# Patient Record
Sex: FEMALE | NOT HISPANIC OR LATINO | Employment: OTHER
[De-identification: names, ages, dates, MRNs, and addresses within clinical notes are randomized per-mention and may not be internally consistent; named-entity substitution may affect disease eponyms.]

---

## 2017-06-13 PROBLEM — M79.18 MYOFASCIAL PAIN: Status: ACTIVE | Noted: 2017-06-13

## 2017-06-13 PROBLEM — M54.31 SCIATICA OF RIGHT SIDE: Status: ACTIVE | Noted: 2017-06-13

## 2017-06-16 PROBLEM — M54.41 ACUTE MIDLINE LOW BACK PAIN WITH RIGHT-SIDED SCIATICA: Status: ACTIVE | Noted: 2017-06-16

## 2017-10-23 PROBLEM — M51.26 HERNIATED LUMBAR INTERVERTEBRAL DISC: Status: ACTIVE | Noted: 2017-10-23

## 2017-11-01 ENCOUNTER — HOSPITAL (OUTPATIENT)
Dept: OTHER | Age: 72
End: 2017-11-01
Attending: ORTHOPAEDIC SURGERY

## 2017-11-02 ENCOUNTER — CHARTING TRANS (OUTPATIENT)
Dept: OTHER | Age: 72
End: 2017-11-02

## 2017-11-06 PROBLEM — M96.1 LUMBAR POST-LAMINECTOMY SYNDROME: Status: ACTIVE | Noted: 2017-11-06

## 2017-11-21 ENCOUNTER — CHARTING TRANS (OUTPATIENT)
Dept: OTHER | Age: 72
End: 2017-11-21

## 2017-11-21 ENCOUNTER — HOSPITAL (OUTPATIENT)
Dept: OTHER | Age: 72
End: 2017-11-21
Attending: ORTHOPAEDIC SURGERY

## 2017-11-22 ENCOUNTER — CHARTING TRANS (OUTPATIENT)
Dept: OTHER | Age: 72
End: 2017-11-22

## 2017-11-22 LAB
MAGNESIUM SERPL-MCNC: 2 MG/DL (ref 1.7–2.4)
POTASSIUM SERPL-SCNC: 4.6 MMOL/L (ref 3.4–5.1)

## 2017-11-24 ENCOUNTER — DIAGNOSTIC TRANS (OUTPATIENT)
Dept: OTHER | Age: 72
End: 2017-11-24

## 2017-11-24 LAB
ANALYZER ANC (IANC): ABNORMAL
ANION GAP SERPL CALC-SCNC: 9 MMOL/L (ref 10–20)
BASOPHILS # BLD: 0 THOUSAND/MCL (ref 0–0.3)
BASOPHILS NFR BLD: 0 %
BUN SERPL-MCNC: 18 MG/DL (ref 6–20)
BUN/CREAT SERPL: 33 (ref 7–25)
CALCIUM SERPL-MCNC: 7.8 MG/DL (ref 8.4–10.2)
CHLORIDE: 112 MMOL/L (ref 98–107)
CO2 SERPL-SCNC: 23 MMOL/L (ref 21–32)
CREAT SERPL-MCNC: 0.55 MG/DL (ref 0.51–0.95)
DIFFERENTIAL METHOD BLD: ABNORMAL
EOSINOPHIL # BLD: 0.3 THOUSAND/MCL (ref 0.1–0.5)
EOSINOPHIL NFR BLD: 3 %
ERYTHROCYTE [DISTWIDTH] IN BLOOD: 14.8 % (ref 11–15)
GLUCOSE SERPL-MCNC: 99 MG/DL (ref 65–99)
HEMATOCRIT: 23 % (ref 36–46.5)
HGB BLD-MCNC: 7.6 GM/DL (ref 12–15.5)
LYMPHOCYTES # BLD: 0.7 THOUSAND/MCL (ref 1–4)
LYMPHOCYTES NFR BLD: 6 %
MCH RBC QN AUTO: 28.4 PG (ref 26–34)
MCHC RBC AUTO-ENTMCNC: 33 GM/DL (ref 32–36.5)
MCV RBC AUTO: 85.8 FL (ref 78–100)
METAMYELOCYTES NFR BLD: 6 % (ref 0–2)
MONOCYTES # BLD: 0.9 THOUSAND/MCL (ref 0.3–0.9)
MONOCYTES NFR BLD: 9 %
MYELOCYTES NFR BLD: 1 %
NEUTROPHILS # BLD: 7.5 THOUSAND/MCL (ref 1.8–7.7)
NEUTS BAND NFR BLD: 6 % (ref 0–10)
NEUTS HYPERSEG NFR BLD: 1 %
NEUTS SEG NFR BLD: 67 %
OVALOCYTES (OVALO): ABNORMAL
PATH REV BLD -IMP: ABNORMAL
PLAT MORPH BLD: NORMAL
PLATELET # BLD: 310 THOUSAND/MCL (ref 140–450)
POTASSIUM SERPL-SCNC: 4.3 MMOL/L (ref 3.4–5.1)
RBC # BLD: 2.68 MILLION/MCL (ref 4–5.2)
SODIUM SERPL-SCNC: 140 MMOL/L (ref 135–145)
TEAR DROP CELLS (TEARD): ABNORMAL
VARIANT LYMPHS NFR BLD: 1 % (ref 0–5)
WBC # BLD: 10.2 THOUSAND/MCL (ref 4.2–11)
WBC MORPH BLD: NORMAL

## 2017-12-01 PROBLEM — M76.51 PATELLAR TENDINITIS OF RIGHT KNEE: Status: ACTIVE | Noted: 2017-12-01

## 2017-12-05 PROBLEM — M48.061 SPINAL STENOSIS OF LUMBAR REGION: Status: ACTIVE | Noted: 2017-12-05

## 2017-12-08 PROBLEM — Z79.01 ANTICOAGULATED: Status: ACTIVE | Noted: 2017-12-08

## 2017-12-08 PROBLEM — Z86.79 ATRIAL FIBRILLATION, CURRENTLY IN SINUS RHYTHM: Status: ACTIVE | Noted: 2017-12-08

## 2017-12-11 PROBLEM — I48.0 PAROXYSMAL ATRIAL FIBRILLATION (HCC): Status: ACTIVE | Noted: 2017-12-08

## 2017-12-11 PROBLEM — I48.91 A-FIB (HCC): Status: ACTIVE | Noted: 2017-12-08

## 2017-12-11 PROBLEM — I48.0 PAROXYSMAL A-FIB (HCC): Status: ACTIVE | Noted: 2017-12-08

## 2017-12-12 PROBLEM — M54.31 SCIATICA OF RIGHT SIDE: Status: RESOLVED | Noted: 2017-06-13 | Resolved: 2017-12-12

## 2017-12-12 PROBLEM — M79.18 MYOFASCIAL PAIN: Status: RESOLVED | Noted: 2017-06-13 | Resolved: 2017-12-12

## 2018-01-17 PROBLEM — D75.839 THROMBOCYTOSIS: Status: ACTIVE | Noted: 2018-01-17

## 2018-01-17 PROBLEM — M54.41 ACUTE MIDLINE LOW BACK PAIN WITH RIGHT-SIDED SCIATICA: Status: RESOLVED | Noted: 2017-06-16 | Resolved: 2018-01-17

## 2018-01-25 PROBLEM — D75.839 THROMBOCYTOSIS: Status: RESOLVED | Noted: 2018-01-17 | Resolved: 2018-01-25

## 2018-02-19 PROBLEM — M51.26 HERNIATED LUMBAR INTERVERTEBRAL DISC: Status: RESOLVED | Noted: 2017-10-23 | Resolved: 2018-02-19

## 2018-07-21 PROCEDURE — 89055 LEUKOCYTE ASSESSMENT FECAL: CPT | Performed by: INTERNAL MEDICINE

## 2018-07-21 PROCEDURE — 87077 CULTURE AEROBIC IDENTIFY: CPT | Performed by: INTERNAL MEDICINE

## 2018-07-21 PROCEDURE — 87045 FECES CULTURE AEROBIC BACT: CPT | Performed by: INTERNAL MEDICINE

## 2018-07-21 PROCEDURE — 87427 SHIGA-LIKE TOXIN AG IA: CPT | Performed by: INTERNAL MEDICINE

## 2018-07-21 PROCEDURE — 87046 STOOL CULTR AEROBIC BACT EA: CPT | Performed by: INTERNAL MEDICINE

## 2018-09-10 PROCEDURE — 83516 IMMUNOASSAY NONANTIBODY: CPT | Performed by: INTERNAL MEDICINE

## 2018-09-10 PROCEDURE — 36415 COLL VENOUS BLD VENIPUNCTURE: CPT | Performed by: INTERNAL MEDICINE

## 2018-09-10 PROCEDURE — 82784 ASSAY IGA/IGD/IGG/IGM EACH: CPT | Performed by: INTERNAL MEDICINE

## 2018-11-12 PROBLEM — M54.50 CHRONIC MIDLINE LOW BACK PAIN WITHOUT SCIATICA: Status: ACTIVE | Noted: 2018-11-12

## 2018-11-12 PROBLEM — G89.29 CHRONIC MIDLINE LOW BACK PAIN WITHOUT SCIATICA: Status: ACTIVE | Noted: 2018-11-12

## 2019-02-03 PROBLEM — I77.819 AORTIC ECTASIA (HCC): Status: ACTIVE | Noted: 2019-02-03

## 2019-02-03 PROBLEM — I77.819 AORTIC ECTASIA: Status: ACTIVE | Noted: 2019-02-03

## 2019-02-04 PROBLEM — M76.51 PATELLAR TENDINITIS OF RIGHT KNEE: Status: RESOLVED | Noted: 2017-12-01 | Resolved: 2019-02-04

## 2019-04-01 PROBLEM — M20.41 HAMMER TOE OF RIGHT FOOT: Status: ACTIVE | Noted: 2019-04-01

## 2019-04-01 PROBLEM — M19.071 PRIMARY OSTEOARTHRITIS OF RIGHT FOOT: Status: ACTIVE | Noted: 2019-04-01

## 2019-11-06 PROBLEM — M81.8 OTHER OSTEOPOROSIS WITHOUT CURRENT PATHOLOGICAL FRACTURE: Status: ACTIVE | Noted: 2019-11-06

## 2019-12-18 PROBLEM — M48.061 SPINAL STENOSIS OF LUMBAR REGION: Status: RESOLVED | Noted: 2017-12-05 | Resolved: 2019-12-18

## 2020-01-14 PROBLEM — M54.16 LEFT LUMBAR RADICULOPATHY: Status: ACTIVE | Noted: 2020-01-14

## 2020-01-22 PROBLEM — M19.071 PRIMARY OSTEOARTHRITIS OF RIGHT FOOT: Status: RESOLVED | Noted: 2019-04-01 | Resolved: 2020-01-22

## 2020-03-13 ENCOUNTER — LAB ENCOUNTER (OUTPATIENT)
Dept: LAB | Age: 75
End: 2020-03-13
Attending: ORTHOPAEDIC SURGERY
Payer: MEDICARE

## 2020-03-13 DIAGNOSIS — Z01.818 PREOP TESTING: ICD-10-CM

## 2020-03-13 LAB
ALBUMIN SERPL-MCNC: 3.5 G/DL (ref 3.4–5)
ALBUMIN/GLOB SERPL: 1 {RATIO} (ref 1–2)
ALP LIVER SERPL-CCNC: 66 U/L (ref 55–142)
ALT SERPL-CCNC: 16 U/L (ref 13–56)
ANION GAP SERPL CALC-SCNC: 5 MMOL/L (ref 0–18)
ANTIBODY SCREEN: NEGATIVE
AST SERPL-CCNC: 14 U/L (ref 15–37)
BASOPHILS # BLD AUTO: 0.03 X10(3) UL (ref 0–0.2)
BASOPHILS NFR BLD AUTO: 0.6 %
BILIRUB SERPL-MCNC: 0.5 MG/DL (ref 0.1–2)
BUN BLD-MCNC: 15 MG/DL (ref 7–18)
BUN/CREAT SERPL: 19.7 (ref 10–20)
CALCIUM BLD-MCNC: 9 MG/DL (ref 8.5–10.1)
CHLORIDE SERPL-SCNC: 111 MMOL/L (ref 98–112)
CO2 SERPL-SCNC: 26 MMOL/L (ref 21–32)
CREAT BLD-MCNC: 0.76 MG/DL (ref 0.55–1.02)
DEPRECATED RDW RBC AUTO: 47.5 FL (ref 35.1–46.3)
EOSINOPHIL # BLD AUTO: 0.17 X10(3) UL (ref 0–0.7)
EOSINOPHIL NFR BLD AUTO: 3.2 %
ERYTHROCYTE [DISTWIDTH] IN BLOOD BY AUTOMATED COUNT: 14 % (ref 11–15)
GLOBULIN PLAS-MCNC: 3.6 G/DL (ref 2.8–4.4)
GLUCOSE BLD-MCNC: 97 MG/DL (ref 70–99)
HCT VFR BLD AUTO: 42.7 % (ref 35–48)
HGB BLD-MCNC: 13.6 G/DL (ref 12–16)
IMM GRANULOCYTES # BLD AUTO: 0.01 X10(3) UL (ref 0–1)
IMM GRANULOCYTES NFR BLD: 0.2 %
LYMPHOCYTES # BLD AUTO: 1.56 X10(3) UL (ref 1–4)
LYMPHOCYTES NFR BLD AUTO: 29.2 %
M PROTEIN MFR SERPL ELPH: 7.1 G/DL (ref 6.4–8.2)
MCH RBC QN AUTO: 29.4 PG (ref 26–34)
MCHC RBC AUTO-ENTMCNC: 31.9 G/DL (ref 31–37)
MCV RBC AUTO: 92.2 FL (ref 80–100)
MONOCYTES # BLD AUTO: 0.63 X10(3) UL (ref 0.1–1)
MONOCYTES NFR BLD AUTO: 11.8 %
MRSA DNA SPEC QL NAA+PROBE: NEGATIVE
NEUTROPHILS # BLD AUTO: 2.95 X10 (3) UL (ref 1.5–7.7)
NEUTROPHILS # BLD AUTO: 2.95 X10(3) UL (ref 1.5–7.7)
NEUTROPHILS NFR BLD AUTO: 55 %
OSMOLALITY SERPL CALC.SUM OF ELEC: 295 MOSM/KG (ref 275–295)
PATIENT FASTING Y/N/NP: YES
PLATELET # BLD AUTO: 333 10(3)UL (ref 150–450)
POTASSIUM SERPL-SCNC: 4.1 MMOL/L (ref 3.5–5.1)
RBC # BLD AUTO: 4.63 X10(6)UL (ref 3.8–5.3)
RH BLOOD TYPE: NEGATIVE
SODIUM SERPL-SCNC: 142 MMOL/L (ref 136–145)
WBC # BLD AUTO: 5.4 X10(3) UL (ref 4–11)

## 2020-03-13 PROCEDURE — 86850 RBC ANTIBODY SCREEN: CPT

## 2020-03-13 PROCEDURE — 86901 BLOOD TYPING SEROLOGIC RH(D): CPT

## 2020-03-13 PROCEDURE — 80053 COMPREHEN METABOLIC PANEL: CPT

## 2020-03-13 PROCEDURE — 85025 COMPLETE CBC W/AUTO DIFF WBC: CPT

## 2020-03-13 PROCEDURE — 87641 MR-STAPH DNA AMP PROBE: CPT

## 2020-03-13 PROCEDURE — 36415 COLL VENOUS BLD VENIPUNCTURE: CPT

## 2020-03-13 PROCEDURE — 86900 BLOOD TYPING SEROLOGIC ABO: CPT

## 2020-05-26 ENCOUNTER — LAB ENCOUNTER (OUTPATIENT)
Dept: LAB | Facility: HOSPITAL | Age: 75
End: 2020-05-26
Attending: ORTHOPAEDIC SURGERY
Payer: MEDICARE

## 2020-05-26 DIAGNOSIS — Z01.818 PREOP TESTING: ICD-10-CM

## 2020-05-26 PROCEDURE — 36415 COLL VENOUS BLD VENIPUNCTURE: CPT

## 2020-05-26 PROCEDURE — 86901 BLOOD TYPING SEROLOGIC RH(D): CPT

## 2020-05-26 PROCEDURE — 86850 RBC ANTIBODY SCREEN: CPT

## 2020-05-26 PROCEDURE — 87641 MR-STAPH DNA AMP PROBE: CPT

## 2020-05-26 PROCEDURE — 86900 BLOOD TYPING SEROLOGIC ABO: CPT

## 2020-05-30 ENCOUNTER — LAB ENCOUNTER (OUTPATIENT)
Dept: LAB | Facility: HOSPITAL | Age: 75
End: 2020-05-30
Attending: ORTHOPAEDIC SURGERY
Payer: MEDICARE

## 2020-05-30 DIAGNOSIS — Z01.818 PREOP TESTING: ICD-10-CM

## 2020-06-02 ENCOUNTER — ANESTHESIA (OUTPATIENT)
Dept: SURGERY | Facility: HOSPITAL | Age: 75
DRG: 460 | End: 2020-06-02
Payer: MEDICARE

## 2020-06-02 ENCOUNTER — ANESTHESIA EVENT (OUTPATIENT)
Dept: SURGERY | Facility: HOSPITAL | Age: 75
DRG: 460 | End: 2020-06-02
Payer: MEDICARE

## 2020-06-02 ENCOUNTER — HOSPITAL ENCOUNTER (INPATIENT)
Facility: HOSPITAL | Age: 75
LOS: 2 days | Discharge: HOME | DRG: 460 | End: 2020-06-04
Attending: ORTHOPAEDIC SURGERY | Admitting: ORTHOPAEDIC SURGERY
Payer: MEDICARE

## 2020-06-02 ENCOUNTER — APPOINTMENT (OUTPATIENT)
Dept: GENERAL RADIOLOGY | Facility: HOSPITAL | Age: 75
DRG: 460 | End: 2020-06-02
Attending: ORTHOPAEDIC SURGERY
Payer: MEDICARE

## 2020-06-02 DIAGNOSIS — Z01.818 PREOP TESTING: Primary | ICD-10-CM

## 2020-06-02 DIAGNOSIS — M51.26 HNP (HERNIATED NUCLEUS PULPOSUS), LUMBAR: ICD-10-CM

## 2020-06-02 DIAGNOSIS — T84.498A MECHANICAL COMPLICATION OF INTERNAL ORTHOPEDIC IMPLANT, INITIAL ENCOUNTER (HCC): ICD-10-CM

## 2020-06-02 PROCEDURE — 88300 SURGICAL PATH GROSS: CPT | Performed by: ORTHOPAEDIC SURGERY

## 2020-06-02 PROCEDURE — 0SP004Z REMOVAL OF INTERNAL FIXATION DEVICE FROM LUMBAR VERTEBRAL JOINT, OPEN APPROACH: ICD-10-PCS | Performed by: ORTHOPAEDIC SURGERY

## 2020-06-02 PROCEDURE — 4A11X4G MONITORING OF PERIPHERAL NERVOUS ELECTRICAL ACTIVITY, INTRAOPERATIVE, EXTERNAL APPROACH: ICD-10-PCS | Performed by: ORTHOPAEDIC SURGERY

## 2020-06-02 PROCEDURE — 76000 FLUOROSCOPY <1 HR PHYS/QHP: CPT | Performed by: ORTHOPAEDIC SURGERY

## 2020-06-02 PROCEDURE — 0SG00AJ FUSION OF LUMBAR VERTEBRAL JOINT WITH INTERBODY FUSION DEVICE, POSTERIOR APPROACH, ANTERIOR COLUMN, OPEN APPROACH: ICD-10-PCS | Performed by: ORTHOPAEDIC SURGERY

## 2020-06-02 PROCEDURE — 95938 SOMATOSENSORY TESTING: CPT | Performed by: ORTHOPAEDIC SURGERY

## 2020-06-02 DEVICE — FILLER BONE VOID 25X50X4MM 5CC: Type: IMPLANTABLE DEVICE | Site: BACK | Status: FUNCTIONAL

## 2020-06-02 DEVICE — 9.1-6MMTIMISFIXEDLORDOSE: Type: IMPLANTABLE DEVICE | Site: BACK | Status: FUNCTIONAL

## 2020-06-02 DEVICE — 9.8-ASTRATICANNULATEDPOLY: Type: IMPLANTABLE DEVICE | Site: BACK | Status: FUNCTIONAL

## 2020-06-02 DEVICE — IMPLANTABLE DEVICE: Type: IMPLANTABLE DEVICE | Site: BACK | Status: FUNCTIONAL

## 2020-06-02 DEVICE — 8.11-ORIOTRIOSTEOTRAPEZOIDAL: Type: IMPLANTABLE DEVICE | Site: BACK | Status: FUNCTIONAL

## 2020-06-02 DEVICE — SCREW ST T30  SPIN ASTRA: Type: IMPLANTABLE DEVICE | Site: BACK | Status: FUNCTIONAL

## 2020-06-02 RX ORDER — EPHEDRINE SULFATE 50 MG/ML
INJECTION, SOLUTION INTRAVENOUS AS NEEDED
Status: DISCONTINUED | OUTPATIENT
Start: 2020-06-02 | End: 2020-06-02 | Stop reason: SURG

## 2020-06-02 RX ORDER — GABAPENTIN 300 MG/1
300 CAPSULE ORAL NIGHTLY
Status: DISCONTINUED | OUTPATIENT
Start: 2020-06-02 | End: 2020-06-04

## 2020-06-02 RX ORDER — ATORVASTATIN CALCIUM 10 MG/1
10 TABLET, FILM COATED ORAL NIGHTLY
Status: DISCONTINUED | OUTPATIENT
Start: 2020-06-02 | End: 2020-06-04

## 2020-06-02 RX ORDER — HYDROCODONE BITARTRATE AND ACETAMINOPHEN 10; 325 MG/1; MG/1
1 TABLET ORAL EVERY 4 HOURS PRN
Status: DISCONTINUED | OUTPATIENT
Start: 2020-06-02 | End: 2020-06-04

## 2020-06-02 RX ORDER — ATENOLOL 50 MG/1
25 TABLET ORAL
Status: DISCONTINUED | OUTPATIENT
Start: 2020-06-03 | End: 2020-06-04

## 2020-06-02 RX ORDER — LIDOCAINE HYDROCHLORIDE 10 MG/ML
INJECTION, SOLUTION EPIDURAL; INFILTRATION; INTRACAUDAL; PERINEURAL AS NEEDED
Status: DISCONTINUED | OUTPATIENT
Start: 2020-06-02 | End: 2020-06-02 | Stop reason: SURG

## 2020-06-02 RX ORDER — METOCLOPRAMIDE 10 MG/1
10 TABLET ORAL ONCE
Status: DISCONTINUED | OUTPATIENT
Start: 2020-06-02 | End: 2020-06-02 | Stop reason: HOSPADM

## 2020-06-02 RX ORDER — SODIUM CHLORIDE, SODIUM LACTATE, POTASSIUM CHLORIDE, CALCIUM CHLORIDE 600; 310; 30; 20 MG/100ML; MG/100ML; MG/100ML; MG/100ML
INJECTION, SOLUTION INTRAVENOUS CONTINUOUS
Status: DISCONTINUED | OUTPATIENT
Start: 2020-06-02 | End: 2020-06-04

## 2020-06-02 RX ORDER — ONDANSETRON 2 MG/ML
4 INJECTION INTRAMUSCULAR; INTRAVENOUS ONCE AS NEEDED
Status: DISCONTINUED | OUTPATIENT
Start: 2020-06-02 | End: 2020-06-02 | Stop reason: HOSPADM

## 2020-06-02 RX ORDER — HYDROMORPHONE HYDROCHLORIDE 1 MG/ML
0.6 INJECTION, SOLUTION INTRAMUSCULAR; INTRAVENOUS; SUBCUTANEOUS EVERY 5 MIN PRN
Status: DISCONTINUED | OUTPATIENT
Start: 2020-06-02 | End: 2020-06-02 | Stop reason: HOSPADM

## 2020-06-02 RX ORDER — ONDANSETRON 2 MG/ML
4 INJECTION INTRAMUSCULAR; INTRAVENOUS EVERY 4 HOURS PRN
Status: DISCONTINUED | OUTPATIENT
Start: 2020-06-02 | End: 2020-06-03

## 2020-06-02 RX ORDER — DIPHENHYDRAMINE HCL 25 MG
25 CAPSULE ORAL EVERY 4 HOURS PRN
Status: DISCONTINUED | OUTPATIENT
Start: 2020-06-02 | End: 2020-06-04

## 2020-06-02 RX ORDER — NALOXONE HYDROCHLORIDE 0.4 MG/ML
80 INJECTION, SOLUTION INTRAMUSCULAR; INTRAVENOUS; SUBCUTANEOUS AS NEEDED
Status: DISCONTINUED | OUTPATIENT
Start: 2020-06-02 | End: 2020-06-02 | Stop reason: HOSPADM

## 2020-06-02 RX ORDER — BISACODYL 10 MG
10 SUPPOSITORY, RECTAL RECTAL
Status: DISCONTINUED | OUTPATIENT
Start: 2020-06-02 | End: 2020-06-04

## 2020-06-02 RX ORDER — METOCLOPRAMIDE HYDROCHLORIDE 5 MG/ML
10 INJECTION INTRAMUSCULAR; INTRAVENOUS EVERY 6 HOURS PRN
Status: DISCONTINUED | OUTPATIENT
Start: 2020-06-02 | End: 2020-06-04

## 2020-06-02 RX ORDER — ROCURONIUM BROMIDE 10 MG/ML
INJECTION, SOLUTION INTRAVENOUS AS NEEDED
Status: DISCONTINUED | OUTPATIENT
Start: 2020-06-02 | End: 2020-06-02 | Stop reason: SURG

## 2020-06-02 RX ORDER — SODIUM CHLORIDE, SODIUM LACTATE, POTASSIUM CHLORIDE, CALCIUM CHLORIDE 600; 310; 30; 20 MG/100ML; MG/100ML; MG/100ML; MG/100ML
INJECTION, SOLUTION INTRAVENOUS CONTINUOUS
Status: DISCONTINUED | OUTPATIENT
Start: 2020-06-02 | End: 2020-06-02 | Stop reason: HOSPADM

## 2020-06-02 RX ORDER — SENNOSIDES 8.6 MG
17.2 TABLET ORAL NIGHTLY
Status: DISCONTINUED | OUTPATIENT
Start: 2020-06-02 | End: 2020-06-04

## 2020-06-02 RX ORDER — HYDROMORPHONE HYDROCHLORIDE 1 MG/ML
0.2 INJECTION, SOLUTION INTRAMUSCULAR; INTRAVENOUS; SUBCUTANEOUS EVERY 5 MIN PRN
Status: DISCONTINUED | OUTPATIENT
Start: 2020-06-02 | End: 2020-06-02 | Stop reason: HOSPADM

## 2020-06-02 RX ORDER — DIAZEPAM 5 MG/1
5 TABLET ORAL ONCE AS NEEDED
Status: DISCONTINUED | OUTPATIENT
Start: 2020-06-02 | End: 2020-06-02 | Stop reason: HOSPADM

## 2020-06-02 RX ORDER — PHENYLEPHRINE HCL 10 MG/ML
VIAL (ML) INJECTION AS NEEDED
Status: DISCONTINUED | OUTPATIENT
Start: 2020-06-02 | End: 2020-06-02 | Stop reason: SURG

## 2020-06-02 RX ORDER — PROCHLORPERAZINE EDISYLATE 5 MG/ML
5 INJECTION INTRAMUSCULAR; INTRAVENOUS ONCE AS NEEDED
Status: COMPLETED | OUTPATIENT
Start: 2020-06-02 | End: 2020-06-02

## 2020-06-02 RX ORDER — HYDROMORPHONE HYDROCHLORIDE 1 MG/ML
0.2 INJECTION, SOLUTION INTRAMUSCULAR; INTRAVENOUS; SUBCUTANEOUS
Status: DISCONTINUED | OUTPATIENT
Start: 2020-06-02 | End: 2020-06-04

## 2020-06-02 RX ORDER — HYDROCODONE BITARTRATE AND ACETAMINOPHEN 10; 325 MG/1; MG/1
2 TABLET ORAL EVERY 4 HOURS PRN
Status: DISCONTINUED | OUTPATIENT
Start: 2020-06-02 | End: 2020-06-04

## 2020-06-02 RX ORDER — BACITRACIN 50000 [USP'U]/1
INJECTION, POWDER, LYOPHILIZED, FOR SOLUTION INTRAMUSCULAR AS NEEDED
Status: DISCONTINUED | OUTPATIENT
Start: 2020-06-02 | End: 2020-06-02 | Stop reason: HOSPADM

## 2020-06-02 RX ORDER — DEXAMETHASONE SODIUM PHOSPHATE 10 MG/ML
10 INJECTION, SOLUTION INTRAMUSCULAR; INTRAVENOUS ONCE
Status: COMPLETED | OUTPATIENT
Start: 2020-06-03 | End: 2020-06-03

## 2020-06-02 RX ORDER — CEFAZOLIN SODIUM/WATER 2 G/20 ML
2 SYRINGE (ML) INTRAVENOUS EVERY 8 HOURS
Status: COMPLETED | OUTPATIENT
Start: 2020-06-02 | End: 2020-06-02

## 2020-06-02 RX ORDER — ACETAMINOPHEN 325 MG/1
650 TABLET ORAL EVERY 4 HOURS PRN
Status: DISCONTINUED | OUTPATIENT
Start: 2020-06-02 | End: 2020-06-04

## 2020-06-02 RX ORDER — SODIUM PHOSPHATE, DIBASIC AND SODIUM PHOSPHATE, MONOBASIC 7; 19 G/133ML; G/133ML
1 ENEMA RECTAL ONCE AS NEEDED
Status: DISCONTINUED | OUTPATIENT
Start: 2020-06-02 | End: 2020-06-04

## 2020-06-02 RX ORDER — DEXAMETHASONE SODIUM PHOSPHATE 4 MG/ML
VIAL (ML) INJECTION AS NEEDED
Status: DISCONTINUED | OUTPATIENT
Start: 2020-06-02 | End: 2020-06-02 | Stop reason: SURG

## 2020-06-02 RX ORDER — GLYCOPYRROLATE 0.2 MG/ML
INJECTION, SOLUTION INTRAMUSCULAR; INTRAVENOUS AS NEEDED
Status: DISCONTINUED | OUTPATIENT
Start: 2020-06-02 | End: 2020-06-02 | Stop reason: SURG

## 2020-06-02 RX ORDER — HALOPERIDOL 5 MG/ML
0.25 INJECTION INTRAMUSCULAR ONCE AS NEEDED
Status: DISCONTINUED | OUTPATIENT
Start: 2020-06-02 | End: 2020-06-02 | Stop reason: HOSPADM

## 2020-06-02 RX ORDER — HYDROMORPHONE HYDROCHLORIDE 1 MG/ML
0.4 INJECTION, SOLUTION INTRAMUSCULAR; INTRAVENOUS; SUBCUTANEOUS EVERY 5 MIN PRN
Status: DISCONTINUED | OUTPATIENT
Start: 2020-06-02 | End: 2020-06-02 | Stop reason: HOSPADM

## 2020-06-02 RX ORDER — ONDANSETRON 2 MG/ML
INJECTION INTRAMUSCULAR; INTRAVENOUS AS NEEDED
Status: DISCONTINUED | OUTPATIENT
Start: 2020-06-02 | End: 2020-06-02 | Stop reason: SURG

## 2020-06-02 RX ORDER — FAMOTIDINE 20 MG/1
20 TABLET ORAL ONCE
Status: DISCONTINUED | OUTPATIENT
Start: 2020-06-02 | End: 2020-06-02 | Stop reason: HOSPADM

## 2020-06-02 RX ORDER — METOPROLOL TARTRATE 5 MG/5ML
2.5 INJECTION INTRAVENOUS ONCE
Status: DISCONTINUED | OUTPATIENT
Start: 2020-06-02 | End: 2020-06-02 | Stop reason: HOSPADM

## 2020-06-02 RX ORDER — POLYETHYLENE GLYCOL 3350 17 G/17G
17 POWDER, FOR SOLUTION ORAL DAILY PRN
Status: DISCONTINUED | OUTPATIENT
Start: 2020-06-02 | End: 2020-06-04

## 2020-06-02 RX ORDER — CEFAZOLIN SODIUM/WATER 2 G/20 ML
2 SYRINGE (ML) INTRAVENOUS ONCE
Status: COMPLETED | OUTPATIENT
Start: 2020-06-02 | End: 2020-06-02

## 2020-06-02 RX ORDER — DIPHENHYDRAMINE HYDROCHLORIDE 50 MG/ML
25 INJECTION INTRAMUSCULAR; INTRAVENOUS EVERY 4 HOURS PRN
Status: DISCONTINUED | OUTPATIENT
Start: 2020-06-02 | End: 2020-06-04

## 2020-06-02 RX ORDER — MAGNESIUM CARB/ALUMINUM HYDROX 105-160MG
296 TABLET,CHEWABLE ORAL ONCE AS NEEDED
Status: DISPENSED | OUTPATIENT
Start: 2020-06-02 | End: 2020-06-02

## 2020-06-02 RX ORDER — TIZANIDINE 2 MG/1
2 TABLET ORAL 3 TIMES DAILY PRN
Status: DISCONTINUED | OUTPATIENT
Start: 2020-06-02 | End: 2020-06-04

## 2020-06-02 RX ORDER — PROCHLORPERAZINE EDISYLATE 5 MG/ML
10 INJECTION INTRAMUSCULAR; INTRAVENOUS EVERY 6 HOURS PRN
Status: ACTIVE | OUTPATIENT
Start: 2020-06-02 | End: 2020-06-04

## 2020-06-02 RX ORDER — CALCIUM CARBONATE 200(500)MG
500 TABLET,CHEWABLE ORAL 2 TIMES DAILY WITH MEALS
Status: DISCONTINUED | OUTPATIENT
Start: 2020-06-02 | End: 2020-06-04

## 2020-06-02 RX ORDER — BUPIVACAINE HYDROCHLORIDE AND EPINEPHRINE 5; 5 MG/ML; UG/ML
INJECTION, SOLUTION PERINEURAL AS NEEDED
Status: DISCONTINUED | OUTPATIENT
Start: 2020-06-02 | End: 2020-06-02 | Stop reason: HOSPADM

## 2020-06-02 RX ORDER — HYDROCODONE BITARTRATE AND ACETAMINOPHEN 5; 325 MG/1; MG/1
2 TABLET ORAL AS NEEDED
Status: DISCONTINUED | OUTPATIENT
Start: 2020-06-02 | End: 2020-06-02 | Stop reason: HOSPADM

## 2020-06-02 RX ORDER — HYDROCODONE BITARTRATE AND ACETAMINOPHEN 5; 325 MG/1; MG/1
1 TABLET ORAL AS NEEDED
Status: DISCONTINUED | OUTPATIENT
Start: 2020-06-02 | End: 2020-06-02 | Stop reason: HOSPADM

## 2020-06-02 RX ORDER — DOCUSATE SODIUM 100 MG/1
100 CAPSULE, LIQUID FILLED ORAL 2 TIMES DAILY
Status: DISCONTINUED | OUTPATIENT
Start: 2020-06-02 | End: 2020-06-04

## 2020-06-02 RX ORDER — ASCORBIC ACID 500 MG
1000 TABLET ORAL 2 TIMES DAILY WITH MEALS
Status: DISCONTINUED | OUTPATIENT
Start: 2020-06-02 | End: 2020-06-04

## 2020-06-02 RX ORDER — ACETAMINOPHEN 500 MG
1000 TABLET ORAL ONCE
Status: COMPLETED | OUTPATIENT
Start: 2020-06-02 | End: 2020-06-02

## 2020-06-02 RX ADMIN — PHENYLEPHRINE HCL 100 MCG: 10 MG/ML VIAL (ML) INJECTION at 07:45:00

## 2020-06-02 RX ADMIN — SODIUM CHLORIDE, SODIUM LACTATE, POTASSIUM CHLORIDE, CALCIUM CHLORIDE: 600; 310; 30; 20 INJECTION, SOLUTION INTRAVENOUS at 10:25:00

## 2020-06-02 RX ADMIN — SODIUM CHLORIDE, SODIUM LACTATE, POTASSIUM CHLORIDE, CALCIUM CHLORIDE: 600; 310; 30; 20 INJECTION, SOLUTION INTRAVENOUS at 07:32:00

## 2020-06-02 RX ADMIN — EPHEDRINE SULFATE 10 MG: 50 INJECTION, SOLUTION INTRAVENOUS at 09:00:00

## 2020-06-02 RX ADMIN — EPHEDRINE SULFATE 10 MG: 50 INJECTION, SOLUTION INTRAVENOUS at 07:42:00

## 2020-06-02 RX ADMIN — CEFAZOLIN SODIUM/WATER 2 G: 2 G/20 ML SYRINGE (ML) INTRAVENOUS at 07:50:00

## 2020-06-02 RX ADMIN — PHENYLEPHRINE HCL 100 MCG: 10 MG/ML VIAL (ML) INJECTION at 09:00:00

## 2020-06-02 RX ADMIN — LIDOCAINE HYDROCHLORIDE 50 MG: 10 INJECTION, SOLUTION EPIDURAL; INFILTRATION; INTRACAUDAL; PERINEURAL at 07:37:00

## 2020-06-02 RX ADMIN — ONDANSETRON 4 MG: 2 INJECTION INTRAMUSCULAR; INTRAVENOUS at 07:37:00

## 2020-06-02 RX ADMIN — DEXAMETHASONE SODIUM PHOSPHATE 6 MG: 4 MG/ML VIAL (ML) INJECTION at 08:00:00

## 2020-06-02 RX ADMIN — ROCURONIUM BROMIDE 10 MG: 10 INJECTION, SOLUTION INTRAVENOUS at 07:37:00

## 2020-06-02 RX ADMIN — EPHEDRINE SULFATE 5 MG: 50 INJECTION, SOLUTION INTRAVENOUS at 07:45:00

## 2020-06-02 RX ADMIN — DEXAMETHASONE SODIUM PHOSPHATE 4 MG: 4 MG/ML VIAL (ML) INJECTION at 07:37:00

## 2020-06-02 RX ADMIN — GLYCOPYRROLATE 0.2 MG: 0.2 INJECTION, SOLUTION INTRAMUSCULAR; INTRAVENOUS at 07:44:00

## 2020-06-02 NOTE — OPERATIVE REPORT
[facility]    PATIENT'S NAME: Didier Stager   ATTENDING PHYSICIAN: Paul Justice. Wilder Mistry MD   OPERATING PHYSICIAN: Paul Justice.  Wilder Mistry MD   PATIENT ACCOUNT#:   [id number]    LOCATION:  [facility] PACU 10 [facility] 10  MEDICAL RECORD #:   [id number]       DA tear, paralysis, nonunion, degeneration level above with time, DVT, PE, and anesthetic risks. She appears to understand and has elected to proceed.     OPERATIVE TECHNIQUE:  The patient was given uncomplicated general endotracheal anesthesia and placed pro ipsilateral side. The left L4 nerve root was then gently retracted medially. A large disc herniation was seen. A cruciate incision was made in the disc. The disc space was entered with a shaver.   [employer] Lina pedicle screws were then placed on the By Daryl Mereditho  Sindhu Lopez MD  d: 06/02/2020 10:41:14  t: 06/02/2020 11:22:13  [facility] [id number]  ALEKSANDRA/

## 2020-06-02 NOTE — ANESTHESIA POSTPROCEDURE EVALUATION
Patient: Michelle Sharp    Procedure Summary     Date:  06/02/20 Room / Location:  [facility] OR [address] [place] [address] OR    Anesthesia Start:  0732 Anesthesia Stop:  3324    Procedures:       POSTERIOR LUMBAR INTERBODY FUSION - MIS TLIF 1 LEVEL (Left )      SPINAL

## 2020-06-02 NOTE — H&P
CHIEF COMPLAINT:  Left leg pain. Frannie Caballero is scheduled for a left L3-L4 MIS TLIF and exchange of hardware at L4-L5. The patient is known to have a left-sided disk herniation L3-L4 above a solid L4-L5 fusion. ALLERGIES:  NONE.     Medications: Xarelto (sto

## 2020-06-02 NOTE — H&P
[facility]ist Team  History and Physical  Admit Date:  6/2/20    ASSESSMENT / PLAN:   77 yo female with hx Lumbar HNP/stenosis/retained hardware who is S/P Revision L3-L4 MIS TLIF/PLF, exchange of HW with L3-L4 Fusion, see below for details    S/P Maicol EXTREMITIES: no LE edema, SCD's     PMH  Past Medical History:   Diagnosis Date   • Arrhythmia     A. Fib   • Back problem    • Bulging disc     C5-6   • Cataract    • Cervical spinal stenosis     C5-6, C6-7   • Chickenpox    • Diverticulosis 6/7/2005   • ANAPHYLAXIS, NAUSEA AND VOMITING  Oxycodone               NAUSEA AND VOMITING    Comment:severe  Clonidine               OTHER (SEE COMMENTS)    Comment:Reaction: dry mouth     Home Medications:  Calcium Carb-Cholecalciferol (CALCIUM 600 + D) 600-200 MG-UN Review of Systems  A comprehensive 10 point review of systems was completed. Pertinent positives and negatives noted in the the HPI. DIAGNOSTIC DATA:   CBC/Chem  No results for input(s): WBC, HGB, MCV, PLT, BAND, INR in the last 168 hours.     I meds.  Monitor mental status and vitals closely  -expected acute blood loss anemia, preop hemoglobin per outpatient chart review 13.4  -Bowel reg, antiemetics  -PT/OT/SW  -as per surgery     PAF  -tele  -resume xarelto when ok with ortho, likely POD#5  -co

## 2020-06-02 NOTE — ANESTHESIA PREPROCEDURE EVALUATION
Anesthesia PreOp Note    HPI:     Maia Estes is a 76year old female who presents for preoperative consultation requested by:  Beni Mason MD    Date of Surgery: 6/2/2020    Procedure(s):  POSTERIOR LUMBAR INTERBODY FUSION - MIS TLIF 1 LEVEL  SPINA Pt reports episodes of acid reflux  Symptoms started Sunday  Worse at night Postmenopausal HRT (hormone replacement therapy)    • Primary osteoarthritis of right foot 4/1/2019   • Progressive hearing loss 5/31/2002   • Sensorineural hearing loss 6/3/2002   • Urinary frequency    • Vaginal cyst    • Vertigo    • Visual impairment daily with food. DO NOT STOP WITHOUT SPEAKING WITH CARDIOLOGY, Disp: 90 tablet, Rfl: 3, 5/30/2020  atenolol 25 MG Oral Tab, Take 1 tablet (25 mg total) by mouth once daily. , Disp: 90 tablet, Rfl: 3, 6/2/2020 at 0600  simvastatin 20 MG Oral Tab, Take 1 tabl of children: 3      Years of education: Not on file      Highest education level: Not on file    Occupational History      Occupation: retired teacher        Comment: health and PE      Occupation:     Social Needs      Financial resource Value Date    WBC 5.4 03/13/2020    RBC 4.63 03/13/2020    HGB 13.6 03/13/2020    HCT 42.7 03/13/2020    MCV 92.2 03/13/2020    MCH 29.4 03/13/2020    MCHC 31.9 03/13/2020    RDW 14.0 03/13/2020    .0 03/13/2020     Lab Results   Component Value Andrea planned.   ESPERANZA LOCKE  6/2/2020 7:06 AM

## 2020-06-02 NOTE — RESPIRATORY THERAPY NOTE
KATI ASSESSMENT:    Pt does not have a previous diagnosis of KATI. Pt does not routinely use a CPAP device at home.

## 2020-06-02 NOTE — ANESTHESIA PROCEDURE NOTES
Airway  Date/Time: 6/2/2020 7:39 AM  Urgency: Elective    Airway not difficult    General Information and Staff    Patient location during procedure: OR  Anesthesiologist: Lidia Parks MD  Performed: anesthesiologist     Indications and Patient Con

## 2020-06-02 NOTE — CM/SW NOTE
SOLO received MDO for DC planning. SW met and spoke with pt at bedside. SW confirmed pt's address and home situation. Pt states she lives in a 2 level home with 1 step needed to go into the home. Pt has 10 steps to go upstairs. Pt lives with her .

## 2020-06-02 NOTE — OPERATIVE REPORT
Pre-postop dx:  Left L3-4 HNP, far lateral stenosis, retained HW L4-5  Proc:  Left L3-4 revision MIS TLIF/PLF, exchange of HW with inst. L3-4-5, fusion, interbody cage, allograft blocks, laminectomy bone  Yanna/Jelani  CORIE  Ebl: 50 cc  Drains none  Comp

## 2020-06-03 PROCEDURE — 97530 THERAPEUTIC ACTIVITIES: CPT

## 2020-06-03 PROCEDURE — 97161 PT EVAL LOW COMPLEX 20 MIN: CPT

## 2020-06-03 PROCEDURE — 85025 COMPLETE CBC W/AUTO DIFF WBC: CPT | Performed by: NURSE PRACTITIONER

## 2020-06-03 PROCEDURE — 97535 SELF CARE MNGMENT TRAINING: CPT

## 2020-06-03 PROCEDURE — 97166 OT EVAL MOD COMPLEX 45 MIN: CPT

## 2020-06-03 PROCEDURE — 97116 GAIT TRAINING THERAPY: CPT

## 2020-06-03 PROCEDURE — 80048 BASIC METABOLIC PNL TOTAL CA: CPT | Performed by: NURSE PRACTITIONER

## 2020-06-03 RX ORDER — POTASSIUM CHLORIDE 14.9 MG/ML
20 INJECTION INTRAVENOUS ONCE
Status: COMPLETED | OUTPATIENT
Start: 2020-06-03 | End: 2020-06-03

## 2020-06-03 RX ORDER — SCOLOPAMINE TRANSDERMAL SYSTEM 1 MG/1
1 PATCH, EXTENDED RELEASE TRANSDERMAL
Status: DISCONTINUED | OUTPATIENT
Start: 2020-06-03 | End: 2020-06-04

## 2020-06-03 RX ORDER — ONDANSETRON 2 MG/ML
4 INJECTION INTRAMUSCULAR; INTRAVENOUS EVERY 6 HOURS PRN
Status: DISCONTINUED | OUTPATIENT
Start: 2020-06-03 | End: 2020-06-04

## 2020-06-03 RX ORDER — PROCHLORPERAZINE EDISYLATE 5 MG/ML
5 INJECTION INTRAMUSCULAR; INTRAVENOUS EVERY 4 HOURS PRN
Status: DISCONTINUED | OUTPATIENT
Start: 2020-06-03 | End: 2020-06-04

## 2020-06-03 RX ORDER — GABAPENTIN 300 MG/1
300 CAPSULE ORAL NIGHTLY
Status: SHIPPED | COMMUNITY
Start: 2020-06-03 | End: 2021-01-29

## 2020-06-03 NOTE — PLAN OF CARE
Arrived post op. Micropore dressingto lumbar area dry and intact. Drowsy, napped. Woke up, raised head of bed and had small emesis (like clear water). Zofran given. Comfortable and refused pain meds for now. Has not been out of bed yet.   Looking forward t distraction and/or relaxation techniques  - Monitor for opioid side effects  - Notify MD/LIP if interventions unsuccessful or patient reports new pain  - Anticipate increased pain with activity and pre-medicate as appropriate  Outcome: Progressing     Prob functional status, cognitive ability or social support system  Outcome: Progressing

## 2020-06-03 NOTE — DISCHARGE SUMMARY
[facility] Hospitalist Discharge Summary   Patient ID:  Mg Lewis  [id number]  76year old  3/19/1945    Admit date: 6/2/2020  Discharge date: 6/4/2020    Primary Care Physician: Alesha Borrego MD   Attending Physician: Telma Haddad MD   Consults: soft, NT, ND, BS+  EXTREMITIES: no edema    Operative Procedures: Procedure(s) (LRB):  POSTERIOR LUMBAR INTERBODY FUSION - MIS TLIF 1 LEVEL (Left)  SPINAL HARDWARE REMOVAL (Left)  Radiology:   Xr Fluoroscopy C-arm Time <1 Hour  (cpt=76000)    Result Date: [address] AT THE [facility] [address] [facility], [phone], [phone]  [address] [place] [address] [address] [address]    Phone:  [phone]   · HYDROcodone-acetaminophen  MG Tabs         Important follow up:   Follow-up Information     DREW Palomares seen and examined independently. Discussed with APN and agree with note above    Patient improved.   Stable for discharge to home    GEN: NAD  RESP CTAB  CV RRR    Time of discharge >30 minutes

## 2020-06-03 NOTE — PROGRESS NOTES
[facility] Hospitalist Team  Progress Note    Graciela Eugene Patient Status:  Inpatient    3/19/1945 MRN [id number]   Location [facility] 4W/SW/SE Attending Jonny Evans MD   Hosp Day # 1 PCP Brinda Reagan MD     CC: Follow Up  PCP: Chanda Marinelli Ox3  RESP: non labored, CTA  CARDIO: Regular, no murmur  ABD: soft, NT, ND, BS+  : woo  EXTREMITIES: no edema, SCD's    DIAGNOSTIC DATA:   Labs:     Recent Labs   Lab 06/03/20  0606   WBC 9.2   HGB 10.6*   MCV 89.6   .0       Recent Labs   Lab 0 PRN  bisacodyl (DULCOLAX) rectal suppository 10 mg, 10 mg, Rectal, Daily PRN  Fleet Enema (FLEET) 7-19 GM/118ML enema 133 mL, 1 enema, Rectal, Once PRN  Metoclopramide HCl (REGLAN) injection 10 mg, 10 mg, Intravenous, Q6H PRN  Prochlorperazine Edisylate (C Fusion, see below for details     S/P Revision L3-L4 MIS TLIF/PLF, exchange of HW with L3-L4 Fusion  -prn norco  -expected acute blood loss anemia, preop hemoglobin per outpatient chart review 13.4-->10.6  -Bowel reg, antiemetics  -PT/OT/SW  -as per surger

## 2020-06-03 NOTE — OCCUPATIONAL THERAPY NOTE
Attempted to see Pt at 8:30, nurse Rupinder stated that pt was vomitting and therapy should check later. Will f/u as schedule allows.

## 2020-06-03 NOTE — PHYSICAL THERAPY NOTE
Chart reviewed, attempted to see pt for PT eval. Per RN, pt with nausea and vomiting, not appropriate for PT at this time. Will f/u later today as appropriate, schedule permitting.     Merlinda Prima, DPT  Physical Therapy  [address]

## 2020-06-03 NOTE — PHYSICAL THERAPY NOTE
PHYSICAL THERAPY EVALUATION - INPATIENT     Room Number: 408/408-A  Evaluation Date: 6/3/2020  Type of Evaluation: Initial   Physician Order: PT Eval and Treat    Presenting Problem: s/p L3-4 TLIF  Reason for Therapy: Mobility Dysfunction and Discharge Pl continued acute PT. Do not anticipate pt will need HHC. PT recommendation HOME WITH INITIAL 24 HR SUPERVISION, OPPT when cleared by MD.     Patient will benefit from continued IP PT services to address these deficits in preparation for discharge.     Via Shawna Villarreal INTRAOCULAR LENS Right 2/4/2016    Performed by Alda Ballesteros MD at [facility] MAIN OR   • [address]  [address]    LASIK   • LUMBAR / TRANSFORAMINAL EPIDURAL STEROID INJECTION Right 9/27/2017    Performed by Carlotta Ortega DO at [facility],  Fair -  Dynamic Standing: Fair -     NEUROLOGICAL FINDINGS  Light touch sensation: in tact BLEs except hyposensitive with N/T B plantar feet- existing from prior to sx    ACTIVITY TOLERANCE  Pre-activity, supine:  SPO2 95% on room air  HR 91 bpm  BP RUE 11 session/findings; All patient questions and concerns addressed    CURRENT GOALS    Goals to be met by: 6/10/20  Patient Goal Patient's self-stated goal is: to be able to get up and walk without back pain   Goal #1 Patient is able to demonstrate supine - sit

## 2020-06-03 NOTE — PROGRESS NOTES
POD 1  Awake, alert, chatty. Feels good. Some incisional pain, controlled with 2 Norco.  No leg sx. No cp, no sob. No paresthesias. Ate a little bit yesterday. Did not get up yesterday. Brace in room. Dressing c and d. SED's and TOM's on.   NVI

## 2020-06-03 NOTE — OCCUPATIONAL THERAPY NOTE
OCCUPATIONAL THERAPY EVALUATION - INPATIENT     Room Number: 408/408-A  Evaluation Date: 6/3/2020  Type of Evaluation: Initial       Physician Order: IP Consult to Occupational Therapy  Reason for Therapy: ADL/IADL Dysfunction and Discharge Planning    [facility] to home w/ assist from  and no OT.      DISCHARGE RECOMMENDATIONS     OT Device Recommendations: Reacher;Sock aid;Long-handled shoehorn;Long-handled sponge    PLAN  OT Treatment Plan: ADL training;Functional transfer training;Patient/Family education [place]   • LUMBAR / TRANSFORAMINAL EPIDURAL STEROID INJECTION Right 7/26/2017    Performed by Shankar Kay DO at [facility]   • QUINN LOCALIZATION WIRE 1 SITE LEFT (RUU=83604)      1993 bn   • OTHER SURGICAL HISTORY  2/23/1999    Fle (including washing, rinsing, drying)?: A Lot  -   Toileting, which includes using toilet, bedpan or urinal? : A Little  -   Putting on and taking off regular upper body clothing?: None  -   Taking care of personal grooming such as brushing teeth?: None  -

## 2020-06-03 NOTE — CONSULTS
Cardiology Consultation  [address] [facility] Patient Status:  Inpatient    3/19/1945 MRN [id number]   Location [facility] 4W/SW/SE Attending Eyad Camargo MD   Hosp Day # 1 PCP Gin Thompson MD     Reason for [facility] 2/18/2016    Performed by Umesh Garcia MD at [facility] MAIN OR   • [place] Right 2/4/2016    Performed by Umesh Garcia MD at [address] [place] [address]   • LUMBAR / TRANSFORAMINAL EPIDURAL STEROID INJECTI daily., Disp: , Rfl:   clotrimazole-betamethasone 1-0.05 % External Cream, Apply twice daily (Patient taking differently: Apply topically 2 (two) times daily as needed.  Apply twice daily ), Disp: 15 g, Rfl: 1        Review of Systems:  Constitutional: nicolle relaxation (grade 1 diastolic dysfunction). 2. Left atrium: The atrium is mildly dilated.     Labs:   CBC:    Lab Results   Component Value Date    WBC 9.2 06/03/2020    WBC 5.4 03/13/2020    WBC 6.05 01/23/2020     Lab Results   Component Value Date    HG surgical standpoint  - Monitor on tele  - Will follow    Jose Guardado MD  Interventional cardiologist, [address] [facility]  6/3/2020  2:31 PM

## 2020-06-04 VITALS
DIASTOLIC BLOOD PRESSURE: 64 MMHG | RESPIRATION RATE: 18 BRPM | WEIGHT: 134 LBS | HEIGHT: 60 IN | BODY MASS INDEX: 26.31 KG/M2 | TEMPERATURE: 98 F | HEART RATE: 72 BPM | OXYGEN SATURATION: 98 % | SYSTOLIC BLOOD PRESSURE: 113 MMHG

## 2020-06-04 PROCEDURE — 97530 THERAPEUTIC ACTIVITIES: CPT

## 2020-06-04 PROCEDURE — 84132 ASSAY OF SERUM POTASSIUM: CPT | Performed by: NURSE PRACTITIONER

## 2020-06-04 PROCEDURE — 97110 THERAPEUTIC EXERCISES: CPT

## 2020-06-04 PROCEDURE — 97116 GAIT TRAINING THERAPY: CPT

## 2020-06-04 PROCEDURE — 85025 COMPLETE CBC W/AUTO DIFF WBC: CPT | Performed by: NURSE PRACTITIONER

## 2020-06-04 PROCEDURE — 97535 SELF CARE MNGMENT TRAINING: CPT

## 2020-06-04 RX ORDER — HYDROCODONE BITARTRATE AND ACETAMINOPHEN 10; 325 MG/1; MG/1
TABLET ORAL
Qty: 30 TABLET | Refills: 0 | Status: SHIPPED | OUTPATIENT
Start: 2020-06-04 | End: 2020-08-21

## 2020-06-04 RX ORDER — RIVAROXABAN 20 MG/1
20 TABLET, FILM COATED ORAL
Qty: 90 TABLET | Refills: 3 | Status: SHIPPED | COMMUNITY
Start: 2020-06-07 | End: 2020-06-04

## 2020-06-04 RX ORDER — RIVAROXABAN 20 MG/1
20 TABLET, FILM COATED ORAL
Qty: 90 TABLET | Refills: 3 | Status: SHIPPED | OUTPATIENT
Start: 2020-06-07 | End: 2021-01-04

## 2020-06-04 NOTE — PHYSICAL THERAPY NOTE
PHYSICAL THERAPY TREATMENT NOTE - INPATIENT     Room Number: 220/408-U       Presenting Problem: s/p L3-4 TLIF    Problem List  Principal Problem:    Lumbar herniated disc      PHYSICAL THERAPY ASSESSMENT     GYPSY Jackson approved sessions and patient was see have...  -   Turning over in bed (including adjusting bedclothes, sheets and blankets)?: A Little   -   Sitting down on and standing up from a chair with arms (e.g., wheelchair, bedside commode, etc.): A Little   -   Moving from lying on back to sitting on assistive device and supervision   Goal #4   Current Status 10 stairs with Min A stoop w/RW x 2 Min A   Goal #5 Patient to demonstrate independence with home activity/exercise instructions provided to patient in preparation for discharge.    Goal #5   Gage

## 2020-06-04 NOTE — PLAN OF CARE
CONFUSED, PICKING AT THINGS IN THE AIR THAT ARE NOT THERE, TALKING TO PEOPLE THAT ARE NOT THERE, SEEING THINGS THAT ARE NOT THERE, NOT MAKING SENSE. FED, NOT TAKING PO WELL.  MCCRAY REMOVED 1100, BLADDER SCAN AT 5PM IS 29 MLMD MARIANN NOTIED ORDERS Troy Fernandez techniques  - Monitor for opioid side effects  - Notify MD/LIP if interventions unsuccessful or patient reports new pain  - Anticipate increased pain with activity and pre-medicate as appropriate  Outcome: Progressing     Problem: RISK FOR INFECTION - ADUL or social support system  Outcome: Progressing

## 2020-06-04 NOTE — PROGRESS NOTES
[facility] Cardiology  Progress Note    Raheem Figueroa Patient Status:  Inpatient    3/19/1945 MRN [id number]   Location [facility] 4W/SW/SE Attending Liban Dick MD   Hosp Day # 2 PCP Vera Thomason MD     Impression:  1) Left mg, 1,000 mg, Oral, BID with meals  lactated ringers infusion, , Intravenous, Continuous  acetaminophen (TYLENOL) tab 650 mg, 650 mg, Oral, Q4H PRN    Or  HYDROcodone-acetaminophen (NORCO)  MG per tab 1 tablet, 1 tablet, Oral, Q4H PRN    Or  HYDROcod dilated.           Event monitor (4/18): PAF noted             Kevin Burgess MD  6/4/2020  9:51 AM

## 2020-06-04 NOTE — PLAN OF CARE
UP WITH VANESSAO BRACE IN [place] AND IN ROOM. MAHENDRA BHAKTA'D CHECK VOID BY 8PM. MULTIPLE EMESIS TODAY.

## 2020-06-04 NOTE — OCCUPATIONAL THERAPY NOTE
OCCUPATIONAL THERAPY TREATMENT NOTE - INPATIENT    Room Number: 408/408-A               Problem List  Principal Problem:    Lumbar herniated disc      OCCUPATIONAL THERAPY ASSESSMENT     Pt seen up in bed, PPE worn by staff, gloves and mask.  Pt reviewed ba ACTIVITY TOLERANCE                         O2 SATURATIONS                ACTIVITIES OF DAILY LIVING ASSESSMENT  AM-PAC ‘6-Clicks’ Inpatient Daily Activity Short Form  How much help from another person does the patient currently need…  -   Putting on an for adls with mod I   Comment: pt tolerated standing 10 min with supervision, RW for support, and pt c/o 7/10 back pain    Patient will complete log roll mod I  Comment: supervision     Patient will manage brace mod I  Comment:min asisst for positioning to

## 2020-06-04 NOTE — PLAN OF CARE
MULTIPLE EMESIS THROUGHOUT THE MORNING. SCOPOLAMINE AND ZOFRAN AND BOLUS GIVEN FOR NAUSEA. UP WITH BRACE WITH PT AND WALKER AND AMBULATED IN [place]. NOW VOIDING AND TOLERATING PO INTAKE AND NORCO GIVEN FOR PAIN.      Problem: Patient/Family Goals  Goal: Deena Zavaleta Progressing  6/3/2020 1859 by Dieudonne Jaramillo RN  Outcome: Progressing     Problem: PAIN - ADULT  Goal: Verbalizes/displays adequate comfort level or patient's stated pain goal  Description  INTERVENTIONS:  - Encourage pt to monitor pain and request assista Lilliana De La Torre RN  Outcome: Progressing     Problem: DISCHARGE PLANNING  Goal: Discharge to home or other facility with appropriate resources  Description  INTERVENTIONS:  - Identify barriers to discharge w/pt and caregiver  - Include patient/family/discha

## 2020-06-04 NOTE — PLAN OF CARE
Patient has remained free from falls throughout stay. Hourly rounding maintained. Pt's bed in lowest position w/ side rails up. Patient has been educated and is compliant w/ call light system. Patient is up with 1 and walker.  Patient has been educated o appropriate pain scale  - Administer analgesics based on type and severity of pain and evaluate response  - Implement non-pharmacological measures as appropriate and evaluate response  - Consider cultural and social influences on pain and pain management Complete POLST form as appropriate  - Assess patient's ability to be responsible for managing their own health  - Refer to Case Management Department for coordinating discharge planning if the patient needs post-hospital services based on physician/LIP ord

## 2020-06-11 PROBLEM — M54.16 LEFT LUMBAR RADICULOPATHY: Status: RESOLVED | Noted: 2020-01-14 | Resolved: 2020-06-11

## 2020-06-11 PROBLEM — M51.26 LUMBAR HERNIATED DISC: Status: RESOLVED | Noted: 2020-06-02 | Resolved: 2020-06-11

## 2020-06-11 PROBLEM — Z98.1 S/P LUMBAR SPINAL FUSION: Status: ACTIVE | Noted: 2020-06-11

## 2020-12-07 PROBLEM — D68.69 HYPERCOAGULABLE STATE, SECONDARY (HCC): Status: ACTIVE | Noted: 2020-12-07

## 2020-12-07 PROBLEM — D68.69 HYPERCOAGULABLE STATE, SECONDARY (HCC): Status: RESOLVED | Noted: 2020-12-07 | Resolved: 2020-12-07

## 2021-01-15 PROBLEM — T84.84XA PAINFUL ORTHOPAEDIC HARDWARE: Status: ACTIVE | Noted: 2017-06-13

## 2021-01-15 PROBLEM — M48.061 LUMBAR FORAMINAL STENOSIS: Status: ACTIVE | Noted: 2017-12-05

## 2021-01-15 PROBLEM — G89.29 CHRONIC RIGHT-SIDED LOW BACK PAIN WITHOUT SCIATICA: Status: ACTIVE | Noted: 2018-11-12

## 2021-01-15 PROBLEM — T84.84XA PAINFUL ORTHOPAEDIC HARDWARE (HCC): Status: ACTIVE | Noted: 2017-06-13

## 2021-01-15 PROBLEM — M54.50 CHRONIC RIGHT-SIDED LOW BACK PAIN WITHOUT SCIATICA: Status: ACTIVE | Noted: 2018-11-12

## 2021-01-15 PROBLEM — Z98.1 S/P LUMBAR FUSION: Status: ACTIVE | Noted: 2020-06-11

## 2021-03-19 PROBLEM — Z98.890 S/P LUMBAR SPINE OPERATION: Status: ACTIVE | Noted: 2021-03-19

## 2021-08-26 PROBLEM — D68.69 SECONDARY HYPERCOAGULABLE STATE (HCC): Status: ACTIVE | Noted: 2020-12-07

## 2021-09-01 ENCOUNTER — HOSPITAL ENCOUNTER (OUTPATIENT)
Dept: GENERAL RADIOLOGY | Facility: HOSPITAL | Age: 76
Discharge: HOME | End: 2021-09-01
Attending: INTERNAL MEDICINE
Payer: MEDICARE

## 2021-09-01 DIAGNOSIS — R13.14 PHARYNGOESOPHAGEAL DYSPHAGIA: ICD-10-CM

## 2021-09-01 PROCEDURE — 74230 X-RAY XM SWLNG FUNCJ C+: CPT | Performed by: INTERNAL MEDICINE

## 2021-09-01 PROCEDURE — 92611 MOTION FLUOROSCOPY/SWALLOW: CPT

## 2021-09-01 NOTE — PROGRESS NOTES
ADULT VIDEOFLUOROSCOPIC SWALLOWING STUDY    Admission Date: 9/1/2021  Evaluation Date: 09/01/21  Radiologist: Dr. Terry Layton: Regular  Diet Recommendations - Liquids:  Thin Liquids    Further Follow-up:  Leonardo French sensation of food getting stuck in her sternal region. She reports this has been a problem since she was a child with respect to food sticking in her sternal region. She reports she regurgitates and re-swallows the food.  Patient reported using a spray th does not enter airway       Overall Impression:   Patient seen for a Videofluoroscopic Swallowing Study (VFSS) to assess oral, pharyngeal, and cervical esophagus stages of swallowing.  Swallow function assessed via thin by cup, thin by straw, puree by spoon 3033      Prior to entering room, SLP donned appropriate PPE for Patient level of isolation including gloves, eyewear, surgical mask. Patient was not masked.

## 2021-09-02 NOTE — PROGRESS NOTES
Call pt and advise the study was fairly unremarkable. I would like her to see GI for further evaluation her sx. Please place referral for dysphagia.   Give pt contact info  Gastroenterology  Dr. Judy Salina Dr. Tresa Piggs Dr. Bernerd Dinning Dr. Farrell Olszewski Dr. German Hanger D

## 2021-10-18 PROBLEM — M47.817 LUMBOSACRAL SPONDYLOSIS WITHOUT MYELOPATHY: Status: ACTIVE | Noted: 2021-10-18

## 2021-11-17 PROBLEM — M47.816 ARTHRITIS OF FACET JOINT OF LUMBAR SPINE: Status: ACTIVE | Noted: 2021-11-17

## 2022-01-06 PROBLEM — Z98.890 S/P LUMBAR SPINE OPERATION: Status: RESOLVED | Noted: 2021-03-19 | Resolved: 2022-01-06

## 2022-01-06 PROBLEM — M47.816 ARTHRITIS OF FACET JOINT OF LUMBAR SPINE: Status: RESOLVED | Noted: 2021-11-17 | Resolved: 2022-01-06

## 2022-01-06 PROBLEM — M47.817 LUMBOSACRAL SPONDYLOSIS WITHOUT MYELOPATHY: Status: RESOLVED | Noted: 2021-10-18 | Resolved: 2022-01-06

## 2022-03-24 PROBLEM — M53.3 SACROILIAC JOINT DYSFUNCTION OF RIGHT SIDE: Status: ACTIVE | Noted: 2022-03-24

## 2022-07-30 ENCOUNTER — LAB ENCOUNTER (OUTPATIENT)
Dept: LAB | Age: 77
End: 2022-07-30
Attending: ORTHOPAEDIC SURGERY
Payer: MEDICARE

## 2022-07-30 DIAGNOSIS — Z01.818 PREOP TESTING: ICD-10-CM

## 2022-07-31 LAB — SARS-COV-2 RNA RESP QL NAA+PROBE: NOT DETECTED

## 2022-08-02 ENCOUNTER — HOSPITAL ENCOUNTER (OUTPATIENT)
Facility: HOSPITAL | Age: 77
Setting detail: HOSPITAL OUTPATIENT SURGERY
Discharge: HOME | End: 2022-08-02
Attending: ORTHOPAEDIC SURGERY | Admitting: ORTHOPAEDIC SURGERY
Payer: MEDICARE

## 2022-08-02 ENCOUNTER — ANESTHESIA EVENT (OUTPATIENT)
Dept: SURGERY | Facility: HOSPITAL | Age: 77
End: 2022-08-02
Payer: MEDICARE

## 2022-08-02 ENCOUNTER — APPOINTMENT (OUTPATIENT)
Dept: GENERAL RADIOLOGY | Facility: HOSPITAL | Age: 77
End: 2022-08-02
Attending: ORTHOPAEDIC SURGERY
Payer: MEDICARE

## 2022-08-02 ENCOUNTER — ANESTHESIA (OUTPATIENT)
Dept: SURGERY | Facility: HOSPITAL | Age: 77
End: 2022-08-02
Payer: MEDICARE

## 2022-08-02 VITALS
HEIGHT: 61 IN | OXYGEN SATURATION: 98 % | RESPIRATION RATE: 16 BRPM | HEART RATE: 75 BPM | BODY MASS INDEX: 23.79 KG/M2 | SYSTOLIC BLOOD PRESSURE: 131 MMHG | WEIGHT: 126 LBS | TEMPERATURE: 98 F | DIASTOLIC BLOOD PRESSURE: 72 MMHG

## 2022-08-02 DIAGNOSIS — Z01.818 PREOP TESTING: Primary | ICD-10-CM

## 2022-08-02 PROCEDURE — 01NB0ZZ RELEASE LUMBAR NERVE, OPEN APPROACH: ICD-10-PCS | Performed by: ORTHOPAEDIC SURGERY

## 2022-08-02 PROCEDURE — 01NR0ZZ RELEASE SACRAL NERVE, OPEN APPROACH: ICD-10-PCS | Performed by: ORTHOPAEDIC SURGERY

## 2022-08-02 PROCEDURE — 76000 FLUOROSCOPY <1 HR PHYS/QHP: CPT | Performed by: ORTHOPAEDIC SURGERY

## 2022-08-02 RX ORDER — CEFAZOLIN SODIUM/WATER 2 G/20 ML
2 SYRINGE (ML) INTRAVENOUS ONCE
Status: COMPLETED | OUTPATIENT
Start: 2022-08-02 | End: 2022-08-02

## 2022-08-02 RX ORDER — PHENYLEPHRINE HCL 10 MG/ML
VIAL (ML) INJECTION AS NEEDED
Status: DISCONTINUED | OUTPATIENT
Start: 2022-08-02 | End: 2022-08-02 | Stop reason: SURG

## 2022-08-02 RX ORDER — ROCURONIUM BROMIDE 10 MG/ML
INJECTION, SOLUTION INTRAVENOUS AS NEEDED
Status: DISCONTINUED | OUTPATIENT
Start: 2022-08-02 | End: 2022-08-02 | Stop reason: SURG

## 2022-08-02 RX ORDER — SODIUM CHLORIDE, SODIUM LACTATE, POTASSIUM CHLORIDE, CALCIUM CHLORIDE 600; 310; 30; 20 MG/100ML; MG/100ML; MG/100ML; MG/100ML
INJECTION, SOLUTION INTRAVENOUS CONTINUOUS
Status: DISCONTINUED | OUTPATIENT
Start: 2022-08-02 | End: 2022-08-02

## 2022-08-02 RX ORDER — MULTIVIT WITH MINERALS/LUTEIN
1000 TABLET ORAL 2 TIMES DAILY
Qty: 60 TABLET | Refills: 0 | Status: SHIPPED | OUTPATIENT
Start: 2022-08-02

## 2022-08-02 RX ORDER — METOPROLOL TARTRATE 5 MG/5ML
2.5 INJECTION INTRAVENOUS ONCE
Status: DISCONTINUED | OUTPATIENT
Start: 2022-08-02 | End: 2022-08-02

## 2022-08-02 RX ORDER — ONDANSETRON 2 MG/ML
4 INJECTION INTRAMUSCULAR; INTRAVENOUS EVERY 6 HOURS PRN
Status: DISCONTINUED | OUTPATIENT
Start: 2022-08-02 | End: 2022-08-02

## 2022-08-02 RX ORDER — ACETAMINOPHEN 10 MG/ML
1000 INJECTION, SOLUTION INTRAVENOUS ONCE
Status: COMPLETED | OUTPATIENT
Start: 2022-08-02 | End: 2022-08-02

## 2022-08-02 RX ORDER — DEXAMETHASONE SODIUM PHOSPHATE 4 MG/ML
VIAL (ML) INJECTION AS NEEDED
Status: DISCONTINUED | OUTPATIENT
Start: 2022-08-02 | End: 2022-08-02 | Stop reason: SURG

## 2022-08-02 RX ORDER — MIDAZOLAM HYDROCHLORIDE 1 MG/ML
INJECTION INTRAMUSCULAR; INTRAVENOUS AS NEEDED
Status: DISCONTINUED | OUTPATIENT
Start: 2022-08-02 | End: 2022-08-02 | Stop reason: SURG

## 2022-08-02 RX ORDER — TRAMADOL HYDROCHLORIDE 50 MG/1
50 TABLET ORAL
Qty: 40 TABLET | Refills: 0 | Status: SHIPPED | OUTPATIENT
Start: 2022-08-02

## 2022-08-02 RX ORDER — ACETAMINOPHEN 500 MG
1000 TABLET ORAL ONCE
Status: COMPLETED | OUTPATIENT
Start: 2022-08-02 | End: 2022-08-02

## 2022-08-02 RX ORDER — LIDOCAINE HYDROCHLORIDE 20 MG/ML
INJECTION, SOLUTION EPIDURAL; INFILTRATION; INTRACAUDAL; PERINEURAL AS NEEDED
Status: DISCONTINUED | OUTPATIENT
Start: 2022-08-02 | End: 2022-08-02 | Stop reason: SURG

## 2022-08-02 RX ORDER — EPHEDRINE SULFATE 50 MG/ML
INJECTION, SOLUTION INTRAVENOUS AS NEEDED
Status: DISCONTINUED | OUTPATIENT
Start: 2022-08-02 | End: 2022-08-02 | Stop reason: SURG

## 2022-08-02 RX ORDER — NALOXONE HYDROCHLORIDE 0.4 MG/ML
80 INJECTION, SOLUTION INTRAMUSCULAR; INTRAVENOUS; SUBCUTANEOUS AS NEEDED
Status: DISCONTINUED | OUTPATIENT
Start: 2022-08-02 | End: 2022-08-02

## 2022-08-02 RX ORDER — ONDANSETRON 2 MG/ML
INJECTION INTRAMUSCULAR; INTRAVENOUS AS NEEDED
Status: DISCONTINUED | OUTPATIENT
Start: 2022-08-02 | End: 2022-08-02 | Stop reason: SURG

## 2022-08-02 RX ORDER — TRAMADOL HYDROCHLORIDE 50 MG/1
50 TABLET ORAL EVERY 4 HOURS PRN
Status: DISCONTINUED | OUTPATIENT
Start: 2022-08-02 | End: 2022-08-02

## 2022-08-02 RX ORDER — GLYCOPYRROLATE 0.2 MG/ML
INJECTION, SOLUTION INTRAMUSCULAR; INTRAVENOUS AS NEEDED
Status: DISCONTINUED | OUTPATIENT
Start: 2022-08-02 | End: 2022-08-02 | Stop reason: SURG

## 2022-08-02 RX ADMIN — ONDANSETRON 4 MG: 2 INJECTION INTRAMUSCULAR; INTRAVENOUS at 16:28:00

## 2022-08-02 RX ADMIN — PHENYLEPHRINE HCL 100 MCG: 10 MG/ML VIAL (ML) INJECTION at 16:25:00

## 2022-08-02 RX ADMIN — MIDAZOLAM HYDROCHLORIDE 2 MG: 1 INJECTION INTRAMUSCULAR; INTRAVENOUS at 16:16:00

## 2022-08-02 RX ADMIN — DEXAMETHASONE SODIUM PHOSPHATE 4 MG: 4 MG/ML VIAL (ML) INJECTION at 16:28:00

## 2022-08-02 RX ADMIN — CEFAZOLIN SODIUM/WATER 2 G: 2 G/20 ML SYRINGE (ML) INTRAVENOUS at 16:28:00

## 2022-08-02 RX ADMIN — ROCURONIUM BROMIDE 40 MG: 10 INJECTION, SOLUTION INTRAVENOUS at 16:26:00

## 2022-08-02 RX ADMIN — SODIUM CHLORIDE, SODIUM LACTATE, POTASSIUM CHLORIDE, CALCIUM CHLORIDE: 600; 310; 30; 20 INJECTION, SOLUTION INTRAVENOUS at 16:16:00

## 2022-08-02 RX ADMIN — ROCURONIUM BROMIDE 10 MG: 10 INJECTION, SOLUTION INTRAVENOUS at 16:20:00

## 2022-08-02 RX ADMIN — EPHEDRINE SULFATE 10 MG: 50 INJECTION, SOLUTION INTRAVENOUS at 16:37:00

## 2022-08-02 RX ADMIN — GLYCOPYRROLATE 0.2 MG: 0.2 INJECTION, SOLUTION INTRAMUSCULAR; INTRAVENOUS at 16:26:00

## 2022-08-02 RX ADMIN — EPHEDRINE SULFATE 5 MG: 50 INJECTION, SOLUTION INTRAVENOUS at 16:40:00

## 2022-08-02 RX ADMIN — LIDOCAINE HYDROCHLORIDE 100 MG: 20 INJECTION, SOLUTION EPIDURAL; INFILTRATION; INTRACAUDAL; PERINEURAL at 16:20:00

## 2022-08-02 NOTE — ANESTHESIA PROCEDURE NOTES
Airway  Date/Time: 8/2/2022 4:22 PM  Urgency: Elective    Airway not difficult    General Information and Staff    Patient location during procedure: OR  Anesthesiologist: Poppy Robb DO  Performed: anesthesiologist     Indications and Patient Condition  Indications for airway management: anesthesia  Spontaneous Ventilation: absent  Sedation level: deep  Preoxygenated: yes  Patient position: sniffing  Mask difficulty assessment: 1 - vent by mask    Final Airway Details  Final airway type: endotracheal airway      Successful airway: ETT  Cuffed: yes   Successful intubation technique: direct laryngoscopy  Facilitating devices/methods: intubating stylet  Endotracheal tube insertion site: oral  Blade: Lucy  Blade size: #3  ETT size (mm): 7.0    Cormack-Lehane Classification: grade IIB - view of arytenoids or posterior of glottis only  Placement verified by: chest auscultation and capnometry   Measured from: teeth  ETT to teeth (cm): 21  Number of attempts at approach: 1  Number of other approaches attempted: 0

## 2022-08-02 NOTE — OPERATIVE REPORT
Pre-postop dx:  Spinal stenosis  Proc:  revisioin right L5-s1 laminectomy, decompression right L5 and S1 roots  Yanna/Jelani FONTENOT  Ebl: 10 ml  Drains: none  Complications: none  To RR stable

## 2022-08-19 NOTE — OPERATIVE REPORT
[facility]    PATIENT'S NAME: Nicky Earing   ATTENDING PHYSICIAN: Franck Diehl. Dain Braun MD   OPERATING PHYSICIAN: Franck Diehl. Dain Braun MD   PATIENT ACCOUNT#:   [id number]    LOCATION:  [address]  MEDICAL RECORD #:   [id number]       YOB: 1945  ADMISSION DATE:       08/02/2022      OPERATION DATE:  08/02/2022    OPERATIVE REPORT      PREOPERATIVE DIAGNOSIS:  Recurrent spinal stenosis, L5-S1. POSTOPERATIVE DIAGNOSIS:  Recurrent spinal stenosis, L5-S1. PROCEDURE:    1. Revision right L5 laminectomy, decompression of L5 nerve root (61680). 2.   Right S1 revision laminectomy, decompression of S1 nerve root (96706). 3.   Use of operating microscope. ASSISTANT:  Oren Barillas PA-C (medically necessary due to high-level complexity of medical procedure). ANESTHESIA:  General endotracheal.    ESTIMATED BLOOD LOSS:  10 mL. DRAINS:  None. COMPLICATIONS:  None. INDICATIONS:  This is a 15-year-old female with severe right leg pain unresponsive to conservative treatment. She has had a previous fusion with instrumentation down to the top of L5. She has had previous decompression at L5-S1 and now has recurrent stenosis. I have recommended revision laminectomy, decompressing the L5 and S1 nerve roots. Risks, benefits, and alternatives were explained to the patient in detail. She appears to understand and has elected to proceed. OPERATIVE TECHNIQUE:  The patient was given uncomplicated general endotracheal anesthesia and placed prone on the Cloward saddle. The face and extremities were well padded. The back was prepped and draped in the usual fashion. She was given preoperative antibiotics and IV steroids. Localizing x-ray was taken with a spinal needle. A 1-1/2 inch incision was made through the previous incision down to the lumbar fascia and intraoperative x-ray revealed correct position of the dissection at L5-S1.   The right-sided fascia was opened with electrocautery, swept up to the level of the facet joint, and Jacinda retractor placed. The microscope was then brought in. While the assistant, Stan Valero PA-C, provided critical microsuction irrigation, a high-speed bur was used to create a right L5 revision hemilaminectomy. The scar tissue was exuberant. We were able to then get in the interval between the ligamentum flavum and the L5 lamina and widened the previous laminotomy site. We then peeled back the ligamentum flavum that was stuck down to the dura and the right L5 nerve root and did a wide foraminotomy, taking care to preserve the vast majority of the right L5-S1 facet joint. At this point we were satisfied with the right L5 nerve root decompression and moved down to S1. We moved the microscope down to the top of the right S1 lamina and entered the spinal canal distally with a small curette. We then peeled the scar from the previous surgery off the ligamentum flavum, taking down the top 4 to 5 mm of bone off the right S1 lamina. Once this was done we were able to peel back the interval between the scarred ligamentum flavum and the S1 nerve root, and then peeled the scarred ligamentum flavum entirely off the dura and the S1 root. We were satisfied at this point with the decompression of both the L5 and S1 roots. There was no disc herniation at this level. The wound was thoroughly irrigated. The deep fascia was repaired with #1 Vicryl suture. Subcutaneous tissue closed with 2-0 PDS. Skin closed with 4-0 Monocryl and Dermabond. A sterile dry dressing was applied. The patient tolerated the procedure well, was extubated and taken to the recovery room with stable blood pressure and pulse. There were no complications. SCDs and TEDs used throughout the case. Dictated By Esperanza Israel MD  d: 08/19/2022 11:47:58  t: 08/19/2022 12:22:15  Johana Mckeon [id number]  [facility]/

## 2022-12-02 PROCEDURE — 99283 EMERGENCY DEPT VISIT LOW MDM: CPT

## 2022-12-02 PROCEDURE — 30901 CONTROL OF NOSEBLEED: CPT

## 2022-12-03 ENCOUNTER — HOSPITAL ENCOUNTER (EMERGENCY)
Facility: HOSPITAL | Age: 77
Discharge: HOME | End: 2022-12-03
Attending: EMERGENCY MEDICINE
Payer: MEDICARE

## 2022-12-03 VITALS
HEART RATE: 61 BPM | BODY MASS INDEX: 24 KG/M2 | WEIGHT: 128 LBS | DIASTOLIC BLOOD PRESSURE: 84 MMHG | TEMPERATURE: 98 F | SYSTOLIC BLOOD PRESSURE: 150 MMHG | OXYGEN SATURATION: 99 % | RESPIRATION RATE: 16 BRPM

## 2022-12-03 DIAGNOSIS — R04.0 EPISTAXIS: Primary | ICD-10-CM

## 2022-12-03 NOTE — DISCHARGE INSTRUCTIONS
Place nasal saline both your nostrils 4 times a day. Gently apply Vaseline in both your nostrils at nighttime using a Q-tip. If you start having bleeding from the nose again, blow your nose, give 2 generous sprays of Afrin in the bleeding nostril and then apply firm pressure with your fingers for 10 minutes. If the bleeding persists, repeat this at one time. If you are still bleeding, return to the ER. Obtain a humidifier and keep it in your bedroom at nighttime.

## 2022-12-03 NOTE — ED INITIAL ASSESSMENT (HPI)
Nose bleed has been on going since 2100 tonight. On thinners for afib.  Bleeding controled in triage

## 2023-01-07 ENCOUNTER — HOSPITAL ENCOUNTER (OUTPATIENT)
Dept: LAB | Age: 78
Discharge: HOME | DRG: 455 | End: 2023-01-07
Attending: ORTHOPAEDIC SURGERY

## 2023-01-07 DIAGNOSIS — Z01.812 PRE-PROCEDURAL LABORATORY EXAMINATIONS: ICD-10-CM

## 2023-01-07 LAB
SARS-COV-2 RNA RESP QL NAA+PROBE: NOT DETECTED
SERVICE CMNT-IMP: NORMAL
SERVICE CMNT-IMP: NORMAL

## 2023-01-07 PROCEDURE — U0003 INFECTIOUS AGENT DETECTION BY NUCLEIC ACID (DNA OR RNA); SEVERE ACUTE RESPIRATORY SYNDROME CORONAVIRUS 2 (SARS-COV-2) (CORONAVIRUS DISEASE [COVID-19]), AMPLIFIED PROBE TECHNIQUE, MAKING USE OF HIGH THROUGHPUT TECHNOLOGIES AS DESCRIBED BY CMS-2020-01-R: HCPCS | Performed by: ORTHOPAEDIC SURGERY

## 2023-01-09 ENCOUNTER — ANESTHESIA EVENT (OUTPATIENT)
Dept: SURGERY | Age: 78
DRG: 455 | End: 2023-01-09

## 2023-01-09 RX ORDER — ATENOLOL 25 MG/1
25 TABLET ORAL DAILY
COMMUNITY
Start: 2022-11-07

## 2023-01-09 RX ORDER — SIMVASTATIN 20 MG
20 TABLET ORAL NIGHTLY
COMMUNITY
Start: 2022-11-07

## 2023-01-09 RX ORDER — RIVAROXABAN 20 MG/1
20 TABLET, FILM COATED ORAL
COMMUNITY
Start: 2023-01-06

## 2023-01-09 RX ORDER — BENZONATATE 200 MG/1
CAPSULE ORAL
COMMUNITY
Start: 2022-11-28

## 2023-01-09 ASSESSMENT — ACTIVITIES OF DAILY LIVING (ADL)
ADL_BEFORE_ADMISSION: INDEPENDENT
ADL_SCORE: 12

## 2023-01-10 ENCOUNTER — HOSPITAL ENCOUNTER (INPATIENT)
Age: 78
LOS: 2 days | Discharge: HOME | DRG: 455 | End: 2023-01-12
Attending: ORTHOPAEDIC SURGERY | Admitting: ORTHOPAEDIC SURGERY

## 2023-01-10 ENCOUNTER — APPOINTMENT (OUTPATIENT)
Dept: GENERAL RADIOLOGY | Age: 78
DRG: 455 | End: 2023-01-10
Attending: ORTHOPAEDIC SURGERY

## 2023-01-10 ENCOUNTER — ANESTHESIA (OUTPATIENT)
Dept: SURGERY | Age: 78
DRG: 455 | End: 2023-01-10

## 2023-01-10 DIAGNOSIS — Z01.812 PRE-PROCEDURAL LABORATORY EXAMINATIONS: Primary | ICD-10-CM

## 2023-01-10 PROBLEM — Z98.1 S/P LUMBAR SPINAL FUSION: Status: ACTIVE | Noted: 2023-01-10

## 2023-01-10 LAB
ABO + RH BLD: NORMAL
APTT PPP: 40 SEC (ref 22–30)
BLD GP AB SCN SERPL QL GEL: NEGATIVE
BLOOD EXPIRATION DATE: NORMAL
CROSSMATCH RESULT: NORMAL
CROSSMATCH RESULT: NORMAL
DEPRECATED RDW RBC: 48 FL (ref 39–50)
DISPENSE STATUS: NORMAL
ERYTHROCYTE [DISTWIDTH] IN BLOOD: 14.6 % (ref 11–15)
HCT VFR BLD CALC: 38.3 % (ref 36–46.5)
HGB BLD-MCNC: 12.3 G/DL (ref 12–15.5)
INR PPP: 1
ISBT BLOOD TYPE: 5100
ISBT BLOOD TYPE: 5100
ISBT BLOOD TYPE: 9500
ISBT BLOOD TYPE: 9500
ISSUE DATE/TIME: NORMAL
MCH RBC QN AUTO: 29.1 PG (ref 26–34)
MCHC RBC AUTO-ENTMCNC: 32.1 G/DL (ref 32–36.5)
MCV RBC AUTO: 90.5 FL (ref 78–100)
NRBC BLD MANUAL-RTO: 0 /100 WBC
PLATELET # BLD AUTO: 286 K/MCL (ref 140–450)
PRODUCT CODE: NORMAL
PRODUCT DESCRIPTION: NORMAL
PRODUCT ID: NORMAL
PROTHROMBIN TIME: 10.3 SEC (ref 9.7–11.8)
RBC # BLD: 4.23 MIL/MCL (ref 4–5.2)
TYPE AND SCREEN EXPIRATION DATE: NORMAL
UNIT BLOOD TYPE: NORMAL
UNIT NUMBER: NORMAL
WBC # BLD: 14 K/MCL (ref 4.2–11)

## 2023-01-10 PROCEDURE — 13000003 HB ANESTHESIA  GENERAL EA ADD MINUTE: Performed by: ORTHOPAEDIC SURGERY

## 2023-01-10 PROCEDURE — 72100 X-RAY EXAM L-S SPINE 2/3 VWS: CPT

## 2023-01-10 PROCEDURE — 01NR0ZZ RELEASE SACRAL NERVE, OPEN APPROACH: ICD-10-PCS | Performed by: ORTHOPAEDIC SURGERY

## 2023-01-10 PROCEDURE — C1713 ANCHOR/SCREW BN/BN,TIS/BN: HCPCS | Performed by: ORTHOPAEDIC SURGERY

## 2023-01-10 PROCEDURE — 01NB0ZZ RELEASE LUMBAR NERVE, OPEN APPROACH: ICD-10-PCS | Performed by: ORTHOPAEDIC SURGERY

## 2023-01-10 PROCEDURE — 10004452 HB PACU ADDL 30 MINUTES: Performed by: ORTHOPAEDIC SURGERY

## 2023-01-10 PROCEDURE — 10000002 HB ROOM CHARGE MED SURG

## 2023-01-10 PROCEDURE — 36415 COLL VENOUS BLD VENIPUNCTURE: CPT | Performed by: PHYSICIAN ASSISTANT

## 2023-01-10 PROCEDURE — 10004651 HB RX, NO CHARGE ITEM: Performed by: ORTHOPAEDIC SURGERY

## 2023-01-10 PROCEDURE — 85610 PROTHROMBIN TIME: CPT | Performed by: ORTHOPAEDIC SURGERY

## 2023-01-10 PROCEDURE — 0SG30J1 FUSION OF LUMBOSACRAL JOINT WITH SYNTHETIC SUBSTITUTE, POSTERIOR APPROACH, POSTERIOR COLUMN, OPEN APPROACH: ICD-10-PCS | Performed by: ORTHOPAEDIC SURGERY

## 2023-01-10 PROCEDURE — 10002807 HB RX 258

## 2023-01-10 PROCEDURE — 10002801 HB RX 250 W/O HCPCS

## 2023-01-10 PROCEDURE — 13000002 HB ANESTHESIA  GENERAL  S/U + 1ST 15 MIN: Performed by: ORTHOPAEDIC SURGERY

## 2023-01-10 PROCEDURE — 10002801 HB RX 250 W/O HCPCS: Performed by: ORTHOPAEDIC SURGERY

## 2023-01-10 PROCEDURE — C1769 GUIDE WIRE: HCPCS | Performed by: ORTHOPAEDIC SURGERY

## 2023-01-10 PROCEDURE — 88300 SURGICAL PATH GROSS: CPT | Performed by: ORTHOPAEDIC SURGERY

## 2023-01-10 PROCEDURE — 86901 BLOOD TYPING SEROLOGIC RH(D): CPT

## 2023-01-10 PROCEDURE — 10002803 HB RX 637: Performed by: PHYSICIAN ASSISTANT

## 2023-01-10 PROCEDURE — 0SG30AJ FUSION OF LUMBOSACRAL JOINT WITH INTERBODY FUSION DEVICE, POSTERIOR APPROACH, ANTERIOR COLUMN, OPEN APPROACH: ICD-10-PCS | Performed by: ORTHOPAEDIC SURGERY

## 2023-01-10 PROCEDURE — 76000 FLUOROSCOPY <1 HR PHYS/QHP: CPT

## 2023-01-10 PROCEDURE — 10006027 HB SUPPLY 278: Performed by: ORTHOPAEDIC SURGERY

## 2023-01-10 PROCEDURE — 10004451 HB PACU RECOVERY 1ST 30 MINUTES: Performed by: ORTHOPAEDIC SURGERY

## 2023-01-10 PROCEDURE — P9059 PLASMA, FRZ BETWEEN 8-24HOUR: HCPCS

## 2023-01-10 PROCEDURE — 13000112 HB NEURO MAJOR COMPLEX CASE S/U + 1ST 15 MIN: Performed by: ORTHOPAEDIC SURGERY

## 2023-01-10 PROCEDURE — 10002803 HB RX 637: Performed by: ORTHOPAEDIC SURGERY

## 2023-01-10 PROCEDURE — 0QP004Z REMOVAL OF INTERNAL FIXATION DEVICE FROM LUMBAR VERTEBRA, OPEN APPROACH: ICD-10-PCS | Performed by: ORTHOPAEDIC SURGERY

## 2023-01-10 PROCEDURE — 13000113 HB NEURO MAJOR COMPLEX CASE EA ADD MINUTE: Performed by: ORTHOPAEDIC SURGERY

## 2023-01-10 PROCEDURE — 10002800 HB RX 250 W HCPCS: Performed by: PHYSICIAN ASSISTANT

## 2023-01-10 PROCEDURE — 85730 THROMBOPLASTIN TIME PARTIAL: CPT | Performed by: PHYSICIAN ASSISTANT

## 2023-01-10 PROCEDURE — 10006023 HB SUPPLY 272: Performed by: ORTHOPAEDIC SURGERY

## 2023-01-10 PROCEDURE — 10002800 HB RX 250 W HCPCS: Performed by: ORTHOPAEDIC SURGERY

## 2023-01-10 PROCEDURE — 10002800 HB RX 250 W HCPCS

## 2023-01-10 PROCEDURE — 86923 COMPATIBILITY TEST ELECTRIC: CPT

## 2023-01-10 PROCEDURE — 13003289 HB OXYGEN THERAPY DAILY

## 2023-01-10 PROCEDURE — 85027 COMPLETE CBC AUTOMATED: CPT | Performed by: ORTHOPAEDIC SURGERY

## 2023-01-10 DEVICE — SCREW BN 6.5MM 45MM CD HZN VYGR MULTIAXIAL NS SPINE 5.5MM: Type: IMPLANTABLE DEVICE | Site: SPINE LUMBAR | Status: FUNCTIONAL

## 2023-01-10 DEVICE — SCREW BN 5.5MM 45MM CD HZN VYGR MULTIAXIAL NS SPINE 5.5MM: Type: IMPLANTABLE DEVICE | Site: SPINE LUMBAR | Status: FUNCTIONAL

## 2023-01-10 DEVICE — BONE VOID FILLER 5CC
Type: IMPLANTABLE DEVICE | Site: SPINE LUMBAR | Status: FUNCTIONAL
Brand: NANOSS 3D ABGS

## 2023-01-10 DEVICE — IMPLANTABLE DEVICE
Type: IMPLANTABLE DEVICE | Site: SPINE LUMBAR | Status: FUNCTIONAL
Brand: SURGIFOAM® ABSORBABLE GELATIN SPONGE, U.S.P.

## 2023-01-10 DEVICE — SCREW SET 5.56MM CD HZN SOLERAL VYGR NS LF: Type: IMPLANTABLE DEVICE | Site: SPINE LUMBAR | Status: FUNCTIONAL

## 2023-01-10 DEVICE — IMPLANTABLE DEVICE: Type: IMPLANTABLE DEVICE | Site: SPINE LUMBAR | Status: FUNCTIONAL

## 2023-01-10 DEVICE — FLOSEAL HEMOSTATIC MATRIX, 10ML
Type: IMPLANTABLE DEVICE | Site: SPINE LUMBAR | Status: FUNCTIONAL
Brand: FLOSEAL HEMOSTATIC MATRIX

## 2023-01-10 RX ORDER — HYDROCODONE BITARTRATE AND ACETAMINOPHEN 10; 325 MG/1; MG/1
1 TABLET ORAL EVERY 4 HOURS PRN
Status: DISCONTINUED | OUTPATIENT
Start: 2023-01-10 | End: 2023-01-11

## 2023-01-10 RX ORDER — HYDRALAZINE HYDROCHLORIDE 20 MG/ML
5 INJECTION INTRAMUSCULAR; INTRAVENOUS EVERY 10 MIN PRN
Status: DISCONTINUED | OUTPATIENT
Start: 2023-01-10 | End: 2023-01-10 | Stop reason: HOSPADM

## 2023-01-10 RX ORDER — DIPHENHYDRAMINE HCL 25 MG
25 CAPSULE ORAL EVERY 4 HOURS PRN
Status: DISCONTINUED | OUTPATIENT
Start: 2023-01-10 | End: 2023-01-12 | Stop reason: HOSPADM

## 2023-01-10 RX ORDER — GLYCOPYRROLATE 0.2 MG/ML
INJECTION, SOLUTION INTRAMUSCULAR; INTRAVENOUS PRN
Status: DISCONTINUED | OUTPATIENT
Start: 2023-01-10 | End: 2023-01-10

## 2023-01-10 RX ORDER — BISACODYL 10 MG
10 SUPPOSITORY, RECTAL RECTAL DAILY PRN
Status: DISCONTINUED | OUTPATIENT
Start: 2023-01-10 | End: 2023-01-12 | Stop reason: HOSPADM

## 2023-01-10 RX ORDER — SODIUM CHLORIDE 9 MG/ML
INJECTION, SOLUTION INTRAVENOUS CONTINUOUS PRN
Status: DISCONTINUED | OUTPATIENT
Start: 2023-01-10 | End: 2023-01-12 | Stop reason: HOSPADM

## 2023-01-10 RX ORDER — SODIUM CHLORIDE, SODIUM LACTATE, POTASSIUM CHLORIDE, CALCIUM CHLORIDE 600; 310; 30; 20 MG/100ML; MG/100ML; MG/100ML; MG/100ML
INJECTION, SOLUTION INTRAVENOUS CONTINUOUS PRN
Status: DISCONTINUED | OUTPATIENT
Start: 2023-01-10 | End: 2023-01-10

## 2023-01-10 RX ORDER — PROPOFOL 10 MG/ML
INJECTION, EMULSION INTRAVENOUS PRN
Status: DISCONTINUED | OUTPATIENT
Start: 2023-01-10 | End: 2023-01-10

## 2023-01-10 RX ORDER — ONDANSETRON 2 MG/ML
INJECTION INTRAMUSCULAR; INTRAVENOUS
Status: DISPENSED
Start: 2023-01-10 | End: 2023-01-11

## 2023-01-10 RX ORDER — LIDOCAINE HYDROCHLORIDE 20 MG/ML
INJECTION, SOLUTION INFILTRATION; PERINEURAL PRN
Status: DISCONTINUED | OUTPATIENT
Start: 2023-01-10 | End: 2023-01-10

## 2023-01-10 RX ORDER — SODIUM CHLORIDE, SODIUM LACTATE, POTASSIUM CHLORIDE, CALCIUM CHLORIDE 600; 310; 30; 20 MG/100ML; MG/100ML; MG/100ML; MG/100ML
INJECTION, SOLUTION INTRAVENOUS CONTINUOUS
Status: DISCONTINUED | OUTPATIENT
Start: 2023-01-10 | End: 2023-01-10 | Stop reason: HOSPADM

## 2023-01-10 RX ORDER — POLYETHYLENE GLYCOL 3350 17 G/17G
17 POWDER, FOR SOLUTION ORAL DAILY
Status: DISCONTINUED | OUTPATIENT
Start: 2023-01-10 | End: 2023-01-12 | Stop reason: HOSPADM

## 2023-01-10 RX ORDER — ACETAMINOPHEN 500 MG
1000 TABLET ORAL
Status: DISCONTINUED | OUTPATIENT
Start: 2023-01-10 | End: 2023-01-10 | Stop reason: HOSPADM

## 2023-01-10 RX ORDER — ONDANSETRON 2 MG/ML
INJECTION INTRAMUSCULAR; INTRAVENOUS PRN
Status: DISCONTINUED | OUTPATIENT
Start: 2023-01-10 | End: 2023-01-10

## 2023-01-10 RX ORDER — ONDANSETRON 4 MG/1
4 TABLET, ORALLY DISINTEGRATING ORAL EVERY 6 HOURS PRN
Status: DISCONTINUED | OUTPATIENT
Start: 2023-01-10 | End: 2023-01-12 | Stop reason: HOSPADM

## 2023-01-10 RX ORDER — ONDANSETRON 2 MG/ML
4 INJECTION INTRAMUSCULAR; INTRAVENOUS 2 TIMES DAILY PRN
Status: DISCONTINUED | OUTPATIENT
Start: 2023-01-10 | End: 2023-01-10 | Stop reason: HOSPADM

## 2023-01-10 RX ORDER — TIZANIDINE 2 MG/1
2 TABLET ORAL EVERY 6 HOURS PRN
Status: DISCONTINUED | OUTPATIENT
Start: 2023-01-10 | End: 2023-01-12 | Stop reason: HOSPADM

## 2023-01-10 RX ORDER — VANCOMYCIN HYDROCHLORIDE 1 G/20ML
INJECTION, POWDER, LYOPHILIZED, FOR SOLUTION INTRAVENOUS PRN
Status: DISCONTINUED | OUTPATIENT
Start: 2023-01-10 | End: 2023-01-10 | Stop reason: HOSPADM

## 2023-01-10 RX ORDER — DIAZEPAM 5 MG/1
TABLET ORAL
Status: DISPENSED
Start: 2023-01-10 | End: 2023-01-11

## 2023-01-10 RX ORDER — CHLORHEXIDINE GLUCONATE ORAL RINSE 1.2 MG/ML
15 SOLUTION DENTAL
Status: COMPLETED | OUTPATIENT
Start: 2023-01-10 | End: 2023-01-10

## 2023-01-10 RX ORDER — HYDROCODONE BITARTRATE AND ACETAMINOPHEN 5; 325 MG/1; MG/1
1 TABLET ORAL EVERY 4 HOURS PRN
Status: DISCONTINUED | OUTPATIENT
Start: 2023-01-10 | End: 2023-01-11

## 2023-01-10 RX ORDER — ACETAMINOPHEN 325 MG/1
650 TABLET ORAL EVERY 6 HOURS PRN
Status: DISCONTINUED | OUTPATIENT
Start: 2023-01-10 | End: 2023-01-12 | Stop reason: HOSPADM

## 2023-01-10 RX ORDER — SODIUM CHLORIDE, SODIUM LACTATE, POTASSIUM CHLORIDE, CALCIUM CHLORIDE 600; 310; 30; 20 MG/100ML; MG/100ML; MG/100ML; MG/100ML
10 INJECTION, SOLUTION INTRAVENOUS CONTINUOUS
Status: DISCONTINUED | OUTPATIENT
Start: 2023-01-10 | End: 2023-01-12 | Stop reason: HOSPADM

## 2023-01-10 RX ORDER — ROCURONIUM BROMIDE 10 MG/ML
INJECTION, SOLUTION INTRAVENOUS PRN
Status: DISCONTINUED | OUTPATIENT
Start: 2023-01-10 | End: 2023-01-10

## 2023-01-10 RX ORDER — CALCIUM CARBONATE 500 MG/1
1000 TABLET, CHEWABLE ORAL EVERY 4 HOURS PRN
Status: DISCONTINUED | OUTPATIENT
Start: 2023-01-10 | End: 2023-01-12 | Stop reason: HOSPADM

## 2023-01-10 RX ORDER — DEXAMETHASONE SODIUM PHOSPHATE 4 MG/ML
4 INJECTION, SOLUTION INTRA-ARTICULAR; INTRALESIONAL; INTRAMUSCULAR; INTRAVENOUS; SOFT TISSUE
Status: DISCONTINUED | OUTPATIENT
Start: 2023-01-10 | End: 2023-01-10 | Stop reason: HOSPADM

## 2023-01-10 RX ORDER — TRANEXAMIC ACID 10 MG/ML
INJECTION, SOLUTION INTRAVENOUS PRN
Status: DISCONTINUED | OUTPATIENT
Start: 2023-01-10 | End: 2023-01-10

## 2023-01-10 RX ORDER — ASCORBIC ACID 500 MG
1000 TABLET ORAL 2 TIMES DAILY WITH MEALS
Status: DISCONTINUED | OUTPATIENT
Start: 2023-01-10 | End: 2023-01-12 | Stop reason: HOSPADM

## 2023-01-10 RX ORDER — 0.9 % SODIUM CHLORIDE 0.9 %
2 VIAL (ML) INJECTION EVERY 12 HOURS SCHEDULED
Status: DISCONTINUED | OUTPATIENT
Start: 2023-01-10 | End: 2023-01-12 | Stop reason: HOSPADM

## 2023-01-10 RX ORDER — ONDANSETRON 2 MG/ML
4 INJECTION INTRAMUSCULAR; INTRAVENOUS EVERY 6 HOURS PRN
Status: DISCONTINUED | OUTPATIENT
Start: 2023-01-10 | End: 2023-01-12 | Stop reason: HOSPADM

## 2023-01-10 RX ORDER — MIDAZOLAM HYDROCHLORIDE 1 MG/ML
INJECTION, SOLUTION INTRAMUSCULAR; INTRAVENOUS PRN
Status: DISCONTINUED | OUTPATIENT
Start: 2023-01-10 | End: 2023-01-10

## 2023-01-10 RX ORDER — DIAZEPAM 5 MG/1
5 TABLET ORAL ONCE
Status: COMPLETED | OUTPATIENT
Start: 2023-01-10 | End: 2023-01-10

## 2023-01-10 RX ORDER — AMOXICILLIN 250 MG
2 CAPSULE ORAL 2 TIMES DAILY
Status: DISCONTINUED | OUTPATIENT
Start: 2023-01-10 | End: 2023-01-12 | Stop reason: HOSPADM

## 2023-01-10 RX ORDER — BUPIVACAINE HYDROCHLORIDE AND EPINEPHRINE 2.5; 5 MG/ML; UG/ML
INJECTION, SOLUTION EPIDURAL; INFILTRATION; INTRACAUDAL; PERINEURAL PRN
Status: DISCONTINUED | OUTPATIENT
Start: 2023-01-10 | End: 2023-01-10 | Stop reason: HOSPADM

## 2023-01-10 RX ORDER — SODIUM CHLORIDE, SODIUM LACTATE, POTASSIUM CHLORIDE, CALCIUM CHLORIDE 600; 310; 30; 20 MG/100ML; MG/100ML; MG/100ML; MG/100ML
INJECTION, SOLUTION INTRAVENOUS CONTINUOUS
Status: DISCONTINUED | OUTPATIENT
Start: 2023-01-10 | End: 2023-01-12 | Stop reason: HOSPADM

## 2023-01-10 RX ORDER — CHLORHEXIDINE GLUCONATE ORAL RINSE 1.2 MG/ML
15 SOLUTION DENTAL AT BEDTIME
Status: DISCONTINUED | OUTPATIENT
Start: 2023-01-10 | End: 2023-01-12 | Stop reason: HOSPADM

## 2023-01-10 RX ORDER — FAMOTIDINE 20 MG/1
20 TABLET, FILM COATED ORAL ONCE
Status: DISCONTINUED | OUTPATIENT
Start: 2023-01-10 | End: 2023-01-12 | Stop reason: HOSPADM

## 2023-01-10 RX ORDER — EPHEDRINE SULFATE/0.9% NACL/PF 50 MG/10ML
SYRINGE (ML) INTRAVENOUS PRN
Status: DISCONTINUED | OUTPATIENT
Start: 2023-01-10 | End: 2023-01-10

## 2023-01-10 RX ADMIN — GLYCOPYRROLATE 0.4 MG: 0.2 INJECTION, SOLUTION INTRAMUSCULAR; INTRAVENOUS at 14:16

## 2023-01-10 RX ADMIN — PROPOFOL 50 MG: 10 INJECTION, EMULSION INTRAVENOUS at 14:20

## 2023-01-10 RX ADMIN — TRANEXAMIC ACID 1000 MG: 10 INJECTION, SOLUTION INTRAVENOUS at 13:46

## 2023-01-10 RX ADMIN — FENTANYL CITRATE 50 MCG: 50 INJECTION INTRAMUSCULAR; INTRAVENOUS at 17:58

## 2023-01-10 RX ADMIN — POLYETHYLENE GLYCOL (3350) 17 G: 17 POWDER, FOR SOLUTION ORAL at 21:11

## 2023-01-10 RX ADMIN — FENTANYL CITRATE 50 MCG: 50 INJECTION INTRAMUSCULAR; INTRAVENOUS at 17:48

## 2023-01-10 RX ADMIN — SENNOSIDES AND DOCUSATE SODIUM 2 TABLET: 50; 8.6 TABLET ORAL at 21:11

## 2023-01-10 RX ADMIN — LIDOCAINE HYDROCHLORIDE 4 ML: 20 INJECTION, SOLUTION INFILTRATION; PERINEURAL at 13:50

## 2023-01-10 RX ADMIN — CHLORHEXIDINE GLUCONATE 15 ML: 1.2 RINSE ORAL at 10:13

## 2023-01-10 RX ADMIN — ONDANSETRON 4 MG: 2 INJECTION INTRAMUSCULAR; INTRAVENOUS at 18:43

## 2023-01-10 RX ADMIN — ONDANSETRON 4 MG: 2 INJECTION INTRAMUSCULAR; INTRAVENOUS at 17:17

## 2023-01-10 RX ADMIN — CEFAZOLIN SODIUM 2000 MG: 300 INJECTION, POWDER, LYOPHILIZED, FOR SOLUTION INTRAVENOUS at 21:11

## 2023-01-10 RX ADMIN — FENTANYL CITRATE 50 MCG: 50 INJECTION INTRAMUSCULAR; INTRAVENOUS at 17:38

## 2023-01-10 RX ADMIN — LABETALOL HYDROCHLORIDE 5 MG: 5 INJECTION INTRAVENOUS at 18:19

## 2023-01-10 RX ADMIN — EPHEDRINE SULFATE 10 MG: 5 INJECTION INTRAVENOUS at 16:06

## 2023-01-10 RX ADMIN — CEFAZOLIN SODIUM 2000 MG: 300 INJECTION, POWDER, LYOPHILIZED, FOR SOLUTION INTRAVENOUS at 13:58

## 2023-01-10 RX ADMIN — HYDROCODONE BITARTRATE AND ACETAMINOPHEN 1 TABLET: 10; 325 TABLET ORAL at 21:11

## 2023-01-10 RX ADMIN — SODIUM CHLORIDE, POTASSIUM CHLORIDE, SODIUM LACTATE AND CALCIUM CHLORIDE: 600; 310; 30; 20 INJECTION, SOLUTION INTRAVENOUS at 13:45

## 2023-01-10 RX ADMIN — MIDAZOLAM HYDROCHLORIDE 1 MG: 1 INJECTION, SOLUTION INTRAMUSCULAR; INTRAVENOUS at 13:48

## 2023-01-10 RX ADMIN — FENTANYL CITRATE 50 MCG: 50 INJECTION INTRAMUSCULAR; INTRAVENOUS at 17:18

## 2023-01-10 RX ADMIN — CHLORHEXIDINE GLUCONATE 15 ML: 1.2 RINSE ORAL at 21:11

## 2023-01-10 RX ADMIN — SODIUM CHLORIDE, PRESERVATIVE FREE 2 ML: 5 INJECTION INTRAVENOUS at 21:12

## 2023-01-10 RX ADMIN — MIDAZOLAM HYDROCHLORIDE 1 MG: 1 INJECTION, SOLUTION INTRAMUSCULAR; INTRAVENOUS at 13:45

## 2023-01-10 RX ADMIN — OXYCODONE HYDROCHLORIDE AND ACETAMINOPHEN 1000 MG: 500 TABLET ORAL at 21:11

## 2023-01-10 RX ADMIN — ONDANSETRON 4 MG: 2 INJECTION INTRAMUSCULAR; INTRAVENOUS at 21:12

## 2023-01-10 RX ADMIN — DIAZEPAM 5 MG: 5 TABLET ORAL at 18:08

## 2023-01-10 RX ADMIN — FENTANYL CITRATE 100 MCG: 50 INJECTION, SOLUTION INTRAMUSCULAR; INTRAVENOUS at 13:47

## 2023-01-10 RX ADMIN — HYDROMORPHONE HYDROCHLORIDE 0.5 MG: 1 INJECTION, SOLUTION INTRAMUSCULAR; INTRAVENOUS; SUBCUTANEOUS at 17:28

## 2023-01-10 RX ADMIN — EPHEDRINE SULFATE 10 MG: 5 INJECTION INTRAVENOUS at 14:30

## 2023-01-10 RX ADMIN — ROCURONIUM BROMIDE 10 MG: 10 INJECTION, SOLUTION INTRAVENOUS at 13:50

## 2023-01-10 RX ADMIN — PROPOFOL 80 MG: 10 INJECTION, EMULSION INTRAVENOUS at 13:50

## 2023-01-10 RX ADMIN — Medication 25 MCG: at 21:11

## 2023-01-10 SDOH — ECONOMIC STABILITY: HOUSING INSECURITY: WHAT IS YOUR LIVING SITUATION TODAY?: SPOUSE

## 2023-01-10 SDOH — SOCIAL STABILITY: SOCIAL NETWORK: SUPPORT SYSTEMS: CHILDREN;FAMILY MEMBERS;NEIGHBORS

## 2023-01-10 SDOH — ECONOMIC STABILITY: FOOD INSECURITY: HOW OFTEN IN THE PAST 12 MONTHS WERE YOU WORRIED OR STRESSED ABOUT HAVING ENOUGH MONEY TO BUY NUTRITIOUS MEALS?: NEVER

## 2023-01-10 SDOH — ECONOMIC STABILITY: HOUSING INSECURITY: ARE YOU WORRIED ABOUT LOSING YOUR HOUSING?: NO

## 2023-01-10 SDOH — HEALTH STABILITY: GENERAL
BECAUSE OF A PHYSICAL, MENTAL, OR EMOTIONAL CONDITION, DO YOU HAVE SERIOUS DIFFICULTY CONCENTRATING, REMEMBERING OR MAKING DECISIONS?: NO

## 2023-01-10 SDOH — ECONOMIC STABILITY: TRANSPORTATION INSECURITY
IN THE PAST 12 MONTHS, HAS THE LACK OF TRANSPORTATION KEPT YOU FROM MEDICAL APPOINTMENTS OR FROM GETTING MEDICATIONS?: NO

## 2023-01-10 SDOH — HEALTH STABILITY: PHYSICAL HEALTH: DO YOU HAVE DIFFICULTY DRESSING OR BATHING?: NO

## 2023-01-10 SDOH — ECONOMIC STABILITY: GENERAL

## 2023-01-10 SDOH — ECONOMIC STABILITY: HOUSING INSECURITY: WHAT IS YOUR LIVING SITUATION TODAY?: HOUSE

## 2023-01-10 SDOH — ECONOMIC STABILITY: TRANSPORTATION INSECURITY
IN THE PAST 12 MONTHS, HAS LACK OF TRANSPORTATION KEPT YOU FROM MEETINGS, WORK, OR FROM GETTING THINGS NEEDED FOR DAILY LIVING?: NO

## 2023-01-10 SDOH — HEALTH STABILITY: PHYSICAL HEALTH: DO YOU HAVE SERIOUS DIFFICULTY WALKING OR CLIMBING STAIRS?: YES

## 2023-01-10 SDOH — SOCIAL STABILITY: SOCIAL NETWORK
HOW OFTEN DO YOU SEE OR TALK TO PEOPLE THAT YOU CARE ABOUT AND FEEL CLOSE TO? (FOR EXAMPLE: TALKING TO FRIENDS ON THE PHONE, VISITING FRIENDS OR FAMILY, GOING TO CHURCH OR CLUB MEETINGS): 1 OR 2 TIMES A WEEK

## 2023-01-10 SDOH — HEALTH STABILITY: GENERAL: BECAUSE OF A PHYSICAL, MENTAL, OR EMOTIONAL CONDITION, DO YOU HAVE DIFFICULTY DOING ERRANDS ALONE?: NO

## 2023-01-10 ASSESSMENT — ACTIVITIES OF DAILY LIVING (ADL)
ADL_SHORT_OF_BREATH: NO
RECENT_DECLINE_ADL: YES, DECLINE IN BATHING/DRESSING/FEEDING, COLLABORATE WITH PROVIDER (T)
ADL_BEFORE_ADMISSION: INDEPENDENT
ADL_SCORE: 12

## 2023-01-10 ASSESSMENT — PAIN SCALES - GENERAL
PAINLEVEL_OUTOF10: 5
PAINLEVEL_OUTOF10: 6
PAINLEVEL_OUTOF10: 8
PAINLEVEL_OUTOF10: 3
PAINLEVEL_OUTOF10: 8
PAINLEVEL_OUTOF10: 9
PAINLEVEL_OUTOF10: 5
PAINLEVEL_OUTOF10: 7
PAINLEVEL_OUTOF10: 8
PAINLEVEL_OUTOF10: 9

## 2023-01-10 ASSESSMENT — LIFESTYLE VARIABLES
HOW OFTEN DO YOU HAVE A DRINK CONTAINING ALCOHOL: NEVER
HOW MANY STANDARD DRINKS CONTAINING ALCOHOL DO YOU HAVE ON A TYPICAL DAY: 0,1 OR 2
HOW OFTEN DO YOU HAVE 6 OR MORE DRINKS ON ONE OCCASION: NEVER
AUDIT-C TOTAL SCORE: 0

## 2023-01-10 ASSESSMENT — COLUMBIA-SUICIDE SEVERITY RATING SCALE - C-SSRS
2. HAVE YOU ACTUALLY HAD ANY THOUGHTS OF KILLING YOURSELF?: NO
6. HAVE YOU EVER DONE ANYTHING, STARTED TO DO ANYTHING, OR PREPARED TO DO ANYTHING TO END YOUR LIFE?: NO
IS THE PATIENT ABLE TO COMPLETE C-SSRS: YES
1. WITHIN THE PAST MONTH, HAVE YOU WISHED YOU WERE DEAD OR WISHED YOU COULD GO TO SLEEP AND NOT WAKE UP?: NO

## 2023-01-10 ASSESSMENT — PATIENT HEALTH QUESTIONNAIRE - PHQ9
CLINICAL INTERPRETATION OF PHQ2 SCORE: NO FURTHER SCREENING NEEDED
1. LITTLE INTEREST OR PLEASURE IN DOING THINGS: NOT AT ALL
2. FEELING DOWN, DEPRESSED OR HOPELESS: NOT AT ALL
SUM OF ALL RESPONSES TO PHQ9 QUESTIONS 1 AND 2: 0
IS PATIENT ABLE TO COMPLETE PHQ2 OR PHQ9: YES
SUM OF ALL RESPONSES TO PHQ9 QUESTIONS 1 AND 2: 0

## 2023-01-10 ASSESSMENT — PAIN DESCRIPTION - PAIN TYPE: TYPE: CHRONIC PAIN

## 2023-01-11 LAB
APTT PPP: 35 SEC (ref 22–30)
ASR DISCLAIMER: NORMAL
CASE RPRT: NORMAL
CLINICAL INFO: NORMAL
PATH REPORT.FINAL DX SPEC: NORMAL
PATH REPORT.GROSS SPEC: NORMAL
RAINBOW EXTRA TUBES HOLD SPECIMEN: NORMAL
RAINBOW EXTRA TUBES HOLD SPECIMEN: NORMAL

## 2023-01-11 PROCEDURE — 10002803 HB RX 637: Performed by: HOSPITALIST

## 2023-01-11 PROCEDURE — 10002803 HB RX 637: Performed by: ORTHOPAEDIC SURGERY

## 2023-01-11 PROCEDURE — 10004651 HB RX, NO CHARGE ITEM: Performed by: ORTHOPAEDIC SURGERY

## 2023-01-11 PROCEDURE — 97163 PT EVAL HIGH COMPLEX 45 MIN: CPT

## 2023-01-11 PROCEDURE — 10002807 HB RX 258: Performed by: HOSPITALIST

## 2023-01-11 PROCEDURE — 10002800 HB RX 250 W HCPCS: Performed by: ORTHOPAEDIC SURGERY

## 2023-01-11 PROCEDURE — 97530 THERAPEUTIC ACTIVITIES: CPT

## 2023-01-11 PROCEDURE — 10000002 HB ROOM CHARGE MED SURG

## 2023-01-11 PROCEDURE — 85730 THROMBOPLASTIN TIME PARTIAL: CPT | Performed by: ORTHOPAEDIC SURGERY

## 2023-01-11 PROCEDURE — 97165 OT EVAL LOW COMPLEX 30 MIN: CPT

## 2023-01-11 PROCEDURE — 97535 SELF CARE MNGMENT TRAINING: CPT

## 2023-01-11 PROCEDURE — 36415 COLL VENOUS BLD VENIPUNCTURE: CPT | Performed by: ORTHOPAEDIC SURGERY

## 2023-01-11 PROCEDURE — 97166 OT EVAL MOD COMPLEX 45 MIN: CPT

## 2023-01-11 RX ORDER — HYDROCODONE BITARTRATE AND ACETAMINOPHEN 5; 325 MG/1; MG/1
1 TABLET ORAL EVERY 4 HOURS PRN
Status: DISCONTINUED | OUTPATIENT
Start: 2023-01-11 | End: 2023-01-12 | Stop reason: HOSPADM

## 2023-01-11 RX ORDER — ATENOLOL 25 MG/1
25 TABLET ORAL DAILY
Status: DISCONTINUED | OUTPATIENT
Start: 2023-01-11 | End: 2023-01-12 | Stop reason: HOSPADM

## 2023-01-11 RX ORDER — HYDROCODONE BITARTRATE AND ACETAMINOPHEN 5; 325 MG/1; MG/1
0.5 TABLET ORAL EVERY 4 HOURS PRN
Status: DISCONTINUED | OUTPATIENT
Start: 2023-01-11 | End: 2023-01-12 | Stop reason: HOSPADM

## 2023-01-11 RX ORDER — ATORVASTATIN CALCIUM 10 MG/1
10 TABLET, FILM COATED ORAL NIGHTLY
Status: DISCONTINUED | OUTPATIENT
Start: 2023-01-11 | End: 2023-01-12 | Stop reason: HOSPADM

## 2023-01-11 RX ORDER — PROCHLORPERAZINE EDISYLATE 5 MG/ML
10 INJECTION INTRAMUSCULAR; INTRAVENOUS EVERY 6 HOURS PRN
Status: DISCONTINUED | OUTPATIENT
Start: 2023-01-11 | End: 2023-01-12 | Stop reason: HOSPADM

## 2023-01-11 RX ADMIN — ONDANSETRON 4 MG: 2 INJECTION INTRAMUSCULAR; INTRAVENOUS at 05:05

## 2023-01-11 RX ADMIN — SENNOSIDES AND DOCUSATE SODIUM 2 TABLET: 50; 8.6 TABLET ORAL at 20:55

## 2023-01-11 RX ADMIN — CHLORHEXIDINE GLUCONATE 15 ML: 1.2 RINSE ORAL at 20:55

## 2023-01-11 RX ADMIN — HYDROMORPHONE HYDROCHLORIDE 0.2 MG: 1 INJECTION, SOLUTION INTRAMUSCULAR; INTRAVENOUS; SUBCUTANEOUS at 05:13

## 2023-01-11 RX ADMIN — HYDROMORPHONE HYDROCHLORIDE 0.2 MG: 1 INJECTION, SOLUTION INTRAMUSCULAR; INTRAVENOUS; SUBCUTANEOUS at 13:56

## 2023-01-11 RX ADMIN — SODIUM CHLORIDE, POTASSIUM CHLORIDE, SODIUM LACTATE AND CALCIUM CHLORIDE: 600; 310; 30; 20 INJECTION, SOLUTION INTRAVENOUS at 22:59

## 2023-01-11 RX ADMIN — SODIUM CHLORIDE, PRESERVATIVE FREE 2 ML: 5 INJECTION INTRAVENOUS at 16:51

## 2023-01-11 RX ADMIN — CEFAZOLIN SODIUM 2000 MG: 300 INJECTION, POWDER, LYOPHILIZED, FOR SOLUTION INTRAVENOUS at 05:05

## 2023-01-11 RX ADMIN — ATORVASTATIN CALCIUM 10 MG: 10 TABLET, FILM COATED ORAL at 20:55

## 2023-01-11 RX ADMIN — SODIUM CHLORIDE, PRESERVATIVE FREE 2 ML: 5 INJECTION INTRAVENOUS at 20:56

## 2023-01-11 RX ADMIN — ATENOLOL 25 MG: 25 TABLET ORAL at 13:56

## 2023-01-11 RX ADMIN — ONDANSETRON 4 MG: 2 INJECTION INTRAMUSCULAR; INTRAVENOUS at 12:08

## 2023-01-11 ASSESSMENT — PAIN SCALES - GENERAL
PAINLEVEL_OUTOF10: 4
PAINLEVEL_OUTOF10: 7
PAINLEVEL_OUTOF10: 6
PAINLEVEL_OUTOF10: 6

## 2023-01-11 ASSESSMENT — COGNITIVE AND FUNCTIONAL STATUS - GENERAL
HELP NEEDED DRESSING REGULAR UPPER BODY CLOTHING: A LITTLE
HELP NEEDED FOR TOILETING: A LITTLE
HELP NEEDED DRESSING REGULAR LOWER BODY CLOTHING: A LITTLE
DAILY_ACTIVITY_RAW_SCORE: 20
BASIC_MOBILITY_RAW_SCORE: 17
DAILY_ACTIVITY_CONVERTED_SCORE: 42.03
BASIC_MOBILITY_CONVERTED_SCORE: 39.67
HELP NEEDED FOR BATHING: A LITTLE

## 2023-01-11 ASSESSMENT — ACTIVITIES OF DAILY LIVING (ADL)
PRIOR_ADL_TOILETING: MODIFIED INDEPENDENT
PRIOR_ADL: MODIFIED INDEPENDENT
PRIOR_ADL: MODIFIED INDEPENDENT
PRIOR_ADL_BATHING: MODIFIED INDEPENDENT
HOME_MANAGEMENT_TIME_ENTRY: 14
EATING: INDEPENDENT
GROOMING: INDEPENDENT

## 2023-01-11 ASSESSMENT — ENCOUNTER SYMPTOMS
PAIN SEVERITY NOW: 7
PAIN SEVERITY NOW: 9

## 2023-01-12 VITALS
HEART RATE: 78 BPM | WEIGHT: 130.29 LBS | TEMPERATURE: 98.6 F | BODY MASS INDEX: 24.6 KG/M2 | OXYGEN SATURATION: 97 % | SYSTOLIC BLOOD PRESSURE: 137 MMHG | RESPIRATION RATE: 18 BRPM | HEIGHT: 61 IN | DIASTOLIC BLOOD PRESSURE: 68 MMHG

## 2023-01-12 PROCEDURE — 97530 THERAPEUTIC ACTIVITIES: CPT

## 2023-01-12 PROCEDURE — 10004651 HB RX, NO CHARGE ITEM: Performed by: ORTHOPAEDIC SURGERY

## 2023-01-12 PROCEDURE — 10002803 HB RX 637: Performed by: ORTHOPAEDIC SURGERY

## 2023-01-12 PROCEDURE — 97535 SELF CARE MNGMENT TRAINING: CPT

## 2023-01-12 PROCEDURE — 10002803 HB RX 637: Performed by: HOSPITALIST

## 2023-01-12 PROCEDURE — 10002803 HB RX 637: Performed by: PHYSICIAN ASSISTANT

## 2023-01-12 PROCEDURE — 97116 GAIT TRAINING THERAPY: CPT

## 2023-01-12 PROCEDURE — 10004180 HB COUNTER-TRANSPORT

## 2023-01-12 PROCEDURE — 10002016 HB COUNTER INCENTIVE SPIROMETRY

## 2023-01-12 RX ORDER — CHOLECALCIFEROL (VITAMIN D3) 25 MCG
25 TABLET ORAL DAILY
Qty: 30 TABLET | Refills: 0 | Status: SHIPPED | OUTPATIENT
Start: 2023-01-13 | End: 2023-11-13 | Stop reason: ALTCHOICE

## 2023-01-12 RX ORDER — TIZANIDINE 2 MG/1
2 TABLET ORAL EVERY 6 HOURS PRN
Qty: 20 TABLET | Refills: 0 | Status: SHIPPED | OUTPATIENT
Start: 2023-01-12

## 2023-01-12 RX ORDER — ACETAMINOPHEN 325 MG/1
650 TABLET ORAL EVERY 6 HOURS PRN
Status: SHIPPED | COMMUNITY
Start: 2023-01-12

## 2023-01-12 RX ADMIN — POLYETHYLENE GLYCOL (3350) 17 G: 17 POWDER, FOR SOLUTION ORAL at 08:59

## 2023-01-12 RX ADMIN — OXYCODONE HYDROCHLORIDE AND ACETAMINOPHEN 1000 MG: 500 TABLET ORAL at 08:59

## 2023-01-12 RX ADMIN — Medication 25 MCG: at 08:59

## 2023-01-12 RX ADMIN — TIZANIDINE 2 MG: 2 TABLET ORAL at 08:59

## 2023-01-12 RX ADMIN — ATENOLOL 25 MG: 25 TABLET ORAL at 08:59

## 2023-01-12 RX ADMIN — ACETAMINOPHEN 650 MG: 325 TABLET ORAL at 04:02

## 2023-01-12 RX ADMIN — HYDROCODONE BITARTRATE AND ACETAMINOPHEN 1 TABLET: 5; 325 TABLET ORAL at 03:43

## 2023-01-12 RX ADMIN — SODIUM CHLORIDE, PRESERVATIVE FREE 2 ML: 5 INJECTION INTRAVENOUS at 09:06

## 2023-01-12 RX ADMIN — SENNOSIDES AND DOCUSATE SODIUM 2 TABLET: 50; 8.6 TABLET ORAL at 08:59

## 2023-01-12 ASSESSMENT — COGNITIVE AND FUNCTIONAL STATUS - GENERAL
DAILY_ACTIVITY_CONVERTED_SCORE: 44.27
HELP NEEDED DRESSING REGULAR LOWER BODY CLOTHING: A LITTLE
HELP NEEDED FOR BATHING: A LITTLE
HELP NEEDED FOR TOILETING: A LITTLE
BASIC_MOBILITY_RAW_SCORE: 18
DAILY_ACTIVITY_RAW_SCORE: 21
BASIC_MOBILITY_CONVERTED_SCORE: 41.05

## 2023-01-12 ASSESSMENT — ENCOUNTER SYMPTOMS
PAIN SEVERITY NOW: 6
PAIN SEVERITY NOW: 5

## 2023-01-12 ASSESSMENT — ACTIVITIES OF DAILY LIVING (ADL): HOME_MANAGEMENT_TIME_ENTRY: 15

## 2023-01-12 ASSESSMENT — PAIN SCALES - GENERAL: PAINLEVEL_OUTOF10: 3

## 2023-11-13 RX ORDER — PANTOPRAZOLE SODIUM 20 MG/1
20 TABLET, DELAYED RELEASE ORAL DAILY
COMMUNITY

## 2023-11-13 ASSESSMENT — COGNITIVE AND FUNCTIONAL STATUS - GENERAL
ARE YOU BLIND OR DO YOU HAVE SERIOUS DIFFICULTY SEEING, EVEN WHEN WEARING GLASSES: NO
ARE YOU DEAF OR DO YOU HAVE SERIOUS DIFFICULTY  HEARING: NO

## 2023-11-13 ASSESSMENT — ACTIVITIES OF DAILY LIVING (ADL)
ADL_SCORE: 12
SENSORY_SUPPORT_DEVICES: EYEGLASSES
ADL_BEFORE_ADMISSION: INDEPENDENT
MOBILITY_ASSIST_DEVICES: STANDARD WALKER

## 2023-11-14 ENCOUNTER — ANESTHESIA EVENT (OUTPATIENT)
Dept: SURGERY | Age: 78
End: 2023-11-14

## 2023-11-14 ENCOUNTER — HOSPITAL ENCOUNTER (OUTPATIENT)
Age: 78
Discharge: HOME | End: 2023-11-14
Attending: ORTHOPAEDIC SURGERY | Admitting: ORTHOPAEDIC SURGERY

## 2023-11-14 ENCOUNTER — ANESTHESIA (OUTPATIENT)
Dept: SURGERY | Age: 78
End: 2023-11-14

## 2023-11-14 ENCOUNTER — APPOINTMENT (OUTPATIENT)
Dept: GENERAL RADIOLOGY | Age: 78
End: 2023-11-14
Attending: ORTHOPAEDIC SURGERY

## 2023-11-14 LAB
ASR DISCLAIMER: NORMAL
CASE RPRT: NORMAL
CLINICAL INFO: NORMAL
PATH REPORT.FINAL DX SPEC: NORMAL
PATH REPORT.GROSS SPEC: NORMAL

## 2023-11-14 PROCEDURE — 10006023 HB SUPPLY 272: Performed by: ORTHOPAEDIC SURGERY

## 2023-11-14 PROCEDURE — 10004452 HB PACU ADDL 30 MINUTES: Performed by: ORTHOPAEDIC SURGERY

## 2023-11-14 PROCEDURE — 10004451 HB PACU RECOVERY 1ST 30 MINUTES: Performed by: ORTHOPAEDIC SURGERY

## 2023-11-14 PROCEDURE — 10002800 HB RX 250 W HCPCS: Performed by: ORTHOPAEDIC SURGERY

## 2023-11-14 PROCEDURE — 10002807 HB RX 258: Performed by: ANESTHESIOLOGY

## 2023-11-14 PROCEDURE — 10005281 FL INTRAOPERATIVE C ARM WITH REPORT

## 2023-11-14 PROCEDURE — 10002800 HB RX 250 W HCPCS: Performed by: ANESTHESIOLOGY

## 2023-11-14 PROCEDURE — 13000003 HB ANESTHESIA  GENERAL EA ADD MINUTE: Performed by: ORTHOPAEDIC SURGERY

## 2023-11-14 PROCEDURE — 10004651 HB RX, NO CHARGE ITEM: Performed by: ORTHOPAEDIC SURGERY

## 2023-11-14 PROCEDURE — 13000109 HB NEURO BASIC CASE EA ADD MINUTE: Performed by: ORTHOPAEDIC SURGERY

## 2023-11-14 PROCEDURE — 88300 SURGICAL PATH GROSS: CPT | Performed by: ORTHOPAEDIC SURGERY

## 2023-11-14 PROCEDURE — 13000002 HB ANESTHESIA  GENERAL  S/U + 1ST 15 MIN: Performed by: ORTHOPAEDIC SURGERY

## 2023-11-14 PROCEDURE — 10002801 HB RX 250 W/O HCPCS: Performed by: ORTHOPAEDIC SURGERY

## 2023-11-14 PROCEDURE — 13000108 HB NEURO BASIC CASE S/U + 1ST 15 MIN: Performed by: ORTHOPAEDIC SURGERY

## 2023-11-14 PROCEDURE — 13000001 HB PHASE II RECOVERY EA 30 MINUTES: Performed by: ORTHOPAEDIC SURGERY

## 2023-11-14 PROCEDURE — 10002801 HB RX 250 W/O HCPCS: Performed by: ANESTHESIOLOGY

## 2023-11-14 RX ORDER — NEOSTIGMINE METHYLSULFATE 1 MG/ML
INJECTION, SOLUTION INTRAVENOUS PRN
Status: DISCONTINUED | OUTPATIENT
Start: 2023-11-14 | End: 2023-11-14

## 2023-11-14 RX ORDER — ONDANSETRON 2 MG/ML
INJECTION INTRAMUSCULAR; INTRAVENOUS PRN
Status: DISCONTINUED | OUTPATIENT
Start: 2023-11-14 | End: 2023-11-14

## 2023-11-14 RX ORDER — 0.9 % SODIUM CHLORIDE 0.9 %
2 VIAL (ML) INJECTION EVERY 12 HOURS SCHEDULED
Status: DISCONTINUED | OUTPATIENT
Start: 2023-11-14 | End: 2023-11-14 | Stop reason: HOSPADM

## 2023-11-14 RX ORDER — CHLORHEXIDINE GLUCONATE ORAL RINSE 1.2 MG/ML
15 SOLUTION DENTAL
Status: DISCONTINUED | OUTPATIENT
Start: 2023-11-14 | End: 2023-11-14 | Stop reason: HOSPADM

## 2023-11-14 RX ORDER — DEXAMETHASONE SODIUM PHOSPHATE 4 MG/ML
INJECTION, SOLUTION INTRA-ARTICULAR; INTRALESIONAL; INTRAMUSCULAR; INTRAVENOUS; SOFT TISSUE PRN
Status: DISCONTINUED | OUTPATIENT
Start: 2023-11-14 | End: 2023-11-14

## 2023-11-14 RX ORDER — ACETAMINOPHEN 650 MG/1
650 SUPPOSITORY RECTAL EVERY 4 HOURS PRN
Status: CANCELLED | OUTPATIENT
Start: 2023-11-14

## 2023-11-14 RX ORDER — HYDRALAZINE HYDROCHLORIDE 20 MG/ML
5 INJECTION INTRAMUSCULAR; INTRAVENOUS EVERY 10 MIN PRN
Status: CANCELLED | OUTPATIENT
Start: 2023-11-14

## 2023-11-14 RX ORDER — 0.9 % SODIUM CHLORIDE 0.9 %
2 VIAL (ML) INJECTION EVERY 12 HOURS SCHEDULED
Status: CANCELLED | OUTPATIENT
Start: 2023-11-14

## 2023-11-14 RX ORDER — GLYCOPYRROLATE 0.2 MG/ML
INJECTION, SOLUTION INTRAMUSCULAR; INTRAVENOUS PRN
Status: DISCONTINUED | OUTPATIENT
Start: 2023-11-14 | End: 2023-11-14

## 2023-11-14 RX ORDER — TIZANIDINE 2 MG/1
2 TABLET ORAL EVERY 6 HOURS PRN
Status: CANCELLED | OUTPATIENT
Start: 2023-11-14

## 2023-11-14 RX ORDER — LIDOCAINE HYDROCHLORIDE 10 MG/ML
5-10 INJECTION, SOLUTION EPIDURAL; INFILTRATION; INTRACAUDAL; PERINEURAL PRN
Status: DISCONTINUED | OUTPATIENT
Start: 2023-11-14 | End: 2023-11-14 | Stop reason: HOSPADM

## 2023-11-14 RX ORDER — PROCHLORPERAZINE EDISYLATE 5 MG/ML
5 INJECTION INTRAMUSCULAR; INTRAVENOUS EVERY 4 HOURS PRN
Status: CANCELLED | OUTPATIENT
Start: 2023-11-14

## 2023-11-14 RX ORDER — ACETAMINOPHEN 500 MG
1000 TABLET ORAL
Status: COMPLETED | OUTPATIENT
Start: 2023-11-14 | End: 2023-11-14

## 2023-11-14 RX ORDER — ONDANSETRON 2 MG/ML
4 INJECTION INTRAMUSCULAR; INTRAVENOUS EVERY 6 HOURS PRN
Status: CANCELLED | OUTPATIENT
Start: 2023-11-14

## 2023-11-14 RX ORDER — PHENYLEPHRINE HYDROCHLORIDE 10 MG/ML
INJECTION, SOLUTION INTRAMUSCULAR; INTRAVENOUS; SUBCUTANEOUS PRN
Status: DISCONTINUED | OUTPATIENT
Start: 2023-11-14 | End: 2023-11-14

## 2023-11-14 RX ORDER — ACETAMINOPHEN 325 MG/1
650 TABLET ORAL EVERY 4 HOURS PRN
Status: CANCELLED | OUTPATIENT
Start: 2023-11-14

## 2023-11-14 RX ORDER — KETAMINE HCL IN NACL, ISO-OSM 100MG/10ML
SYRINGE (ML) INJECTION PRN
Status: DISCONTINUED | OUTPATIENT
Start: 2023-11-14 | End: 2023-11-14

## 2023-11-14 RX ORDER — SODIUM CHLORIDE, SODIUM LACTATE, POTASSIUM CHLORIDE, CALCIUM CHLORIDE 600; 310; 30; 20 MG/100ML; MG/100ML; MG/100ML; MG/100ML
INJECTION, SOLUTION INTRAVENOUS CONTINUOUS
Status: DISCONTINUED | OUTPATIENT
Start: 2023-11-14 | End: 2023-11-14 | Stop reason: HOSPADM

## 2023-11-14 RX ORDER — DROPERIDOL 2.5 MG/ML
0.62 INJECTION, SOLUTION INTRAMUSCULAR; INTRAVENOUS
Status: CANCELLED | OUTPATIENT
Start: 2023-11-14

## 2023-11-14 RX ORDER — ONDANSETRON 2 MG/ML
4 INJECTION INTRAMUSCULAR; INTRAVENOUS 2 TIMES DAILY PRN
Status: CANCELLED | OUTPATIENT
Start: 2023-11-14

## 2023-11-14 RX ORDER — DEXTROSE MONOHYDRATE 25 G/50ML
25 INJECTION, SOLUTION INTRAVENOUS PRN
Status: DISCONTINUED | OUTPATIENT
Start: 2023-11-14 | End: 2023-11-14 | Stop reason: HOSPADM

## 2023-11-14 RX ORDER — ACETAMINOPHEN 10 MG/ML
1000 INJECTION, SOLUTION INTRAVENOUS ONCE
Status: COMPLETED | OUTPATIENT
Start: 2023-11-14 | End: 2023-11-14

## 2023-11-14 RX ORDER — ACETAMINOPHEN 325 MG/1
650 TABLET ORAL
Status: CANCELLED | OUTPATIENT
Start: 2023-11-14 | End: 2023-11-14

## 2023-11-14 RX ORDER — NICOTINE POLACRILEX 4 MG
30 LOZENGE BUCCAL
Status: DISCONTINUED | OUTPATIENT
Start: 2023-11-14 | End: 2023-11-14 | Stop reason: HOSPADM

## 2023-11-14 RX ORDER — BUPIVACAINE HYDROCHLORIDE AND EPINEPHRINE 2.5; 5 MG/ML; UG/ML
INJECTION, SOLUTION EPIDURAL; INFILTRATION; INTRACAUDAL; PERINEURAL PRN
Status: DISCONTINUED | OUTPATIENT
Start: 2023-11-14 | End: 2023-11-14 | Stop reason: HOSPADM

## 2023-11-14 RX ORDER — ONDANSETRON 4 MG/1
4 TABLET, ORALLY DISINTEGRATING ORAL EVERY 6 HOURS PRN
Status: CANCELLED | OUTPATIENT
Start: 2023-11-14

## 2023-11-14 RX ORDER — ALBUTEROL SULFATE 2.5 MG/3ML
2.5 SOLUTION RESPIRATORY (INHALATION)
Status: CANCELLED | OUTPATIENT
Start: 2023-11-14

## 2023-11-14 RX ORDER — ROCURONIUM BROMIDE 10 MG/ML
INJECTION, SOLUTION INTRAVENOUS PRN
Status: DISCONTINUED | OUTPATIENT
Start: 2023-11-14 | End: 2023-11-14

## 2023-11-14 RX ORDER — DEXTROSE MONOHYDRATE 50 MG/ML
INJECTION, SOLUTION INTRAVENOUS CONTINUOUS PRN
Status: DISCONTINUED | OUTPATIENT
Start: 2023-11-14 | End: 2023-11-14 | Stop reason: HOSPADM

## 2023-11-14 RX ORDER — PROPOFOL 10 MG/ML
INJECTION, EMULSION INTRAVENOUS PRN
Status: DISCONTINUED | OUTPATIENT
Start: 2023-11-14 | End: 2023-11-14

## 2023-11-14 RX ORDER — SODIUM CHLORIDE, SODIUM LACTATE, POTASSIUM CHLORIDE, CALCIUM CHLORIDE 600; 310; 30; 20 MG/100ML; MG/100ML; MG/100ML; MG/100ML
INJECTION, SOLUTION INTRAVENOUS CONTINUOUS PRN
Status: DISCONTINUED | OUTPATIENT
Start: 2023-11-14 | End: 2023-11-14

## 2023-11-14 RX ORDER — HUMAN INSULIN 100 [IU]/ML
INJECTION, SOLUTION SUBCUTANEOUS
Status: DISCONTINUED | OUTPATIENT
Start: 2023-11-14 | End: 2023-11-14 | Stop reason: HOSPADM

## 2023-11-14 RX ORDER — CELECOXIB 200 MG/1
200 CAPSULE ORAL
Status: DISCONTINUED | OUTPATIENT
Start: 2023-11-14 | End: 2023-11-14 | Stop reason: HOSPADM

## 2023-11-14 RX ORDER — SODIUM CHLORIDE 9 MG/ML
INJECTION, SOLUTION INTRAVENOUS CONTINUOUS
Status: DISCONTINUED | OUTPATIENT
Start: 2023-11-14 | End: 2023-11-14 | Stop reason: HOSPADM

## 2023-11-14 RX ADMIN — GLYCOPYRROLATE 0.4 MG: 0.2 INJECTION, SOLUTION INTRAMUSCULAR; INTRAVENOUS at 12:19

## 2023-11-14 RX ADMIN — Medication 50 MG: at 11:22

## 2023-11-14 RX ADMIN — CEFAZOLIN SODIUM 2000 MG: 300 INJECTION, POWDER, LYOPHILIZED, FOR SOLUTION INTRAVENOUS at 11:40

## 2023-11-14 RX ADMIN — ACETAMINOPHEN 1000 MG: 500 TABLET ORAL at 09:52

## 2023-11-14 RX ADMIN — Medication 50 MG: at 11:40

## 2023-11-14 RX ADMIN — KETOROLAC TROMETHAMINE 15 MG: 30 INJECTION, SOLUTION INTRAMUSCULAR at 12:10

## 2023-11-14 RX ADMIN — SODIUM CHLORIDE, POTASSIUM CHLORIDE, SODIUM LACTATE AND CALCIUM CHLORIDE: 600; 310; 30; 20 INJECTION, SOLUTION INTRAVENOUS at 10:09

## 2023-11-14 RX ADMIN — ROCURONIUM BROMIDE 30 MG: 10 INJECTION INTRAVENOUS at 11:25

## 2023-11-14 RX ADMIN — PHENYLEPHRINE HYDROCHLORIDE 120 MCG: 10 INJECTION INTRAVENOUS at 11:49

## 2023-11-14 RX ADMIN — ONDANSETRON 4 MG: 2 INJECTION INTRAMUSCULAR; INTRAVENOUS at 11:45

## 2023-11-14 RX ADMIN — DEXAMETHASONE SODIUM PHOSPHATE 8 MG: 4 INJECTION INTRA-ARTICULAR; INTRALESIONAL; INTRAMUSCULAR; INTRAVENOUS; SOFT TISSUE at 11:45

## 2023-11-14 RX ADMIN — SODIUM CHLORIDE, POTASSIUM CHLORIDE, SODIUM LACTATE AND CALCIUM CHLORIDE: 600; 310; 30; 20 INJECTION, SOLUTION INTRAVENOUS at 11:18

## 2023-11-14 RX ADMIN — PROPOFOL 100 MG: 10 INJECTION, EMULSION INTRAVENOUS at 11:25

## 2023-11-14 RX ADMIN — NEOSTIGMINE METHYLSULFATE 2 MG: 1 INJECTION INTRAVENOUS at 12:19

## 2023-11-14 RX ADMIN — ACETAMINOPHEN 1000 MG: 10 INJECTION, SOLUTION INTRAVENOUS at 11:40

## 2023-11-14 ASSESSMENT — PAIN SCALES - GENERAL
PAINLEVEL_OUTOF10: 0

## 2023-11-17 VITALS
BODY MASS INDEX: 23.04 KG/M2 | DIASTOLIC BLOOD PRESSURE: 70 MMHG | HEART RATE: 72 BPM | SYSTOLIC BLOOD PRESSURE: 155 MMHG | WEIGHT: 125.22 LBS | TEMPERATURE: 96.8 F | RESPIRATION RATE: 16 BRPM | OXYGEN SATURATION: 97 % | HEIGHT: 62 IN

## 2024-07-09 RX ORDER — ACETAMINOPHEN 500 MG
500 TABLET ORAL EVERY 6 HOURS PRN
COMMUNITY

## 2024-07-15 ENCOUNTER — LAB ENCOUNTER (OUTPATIENT)
Dept: LAB | Facility: HOSPITAL | Age: 79
End: 2024-07-15
Attending: ORTHOPAEDIC SURGERY
Payer: MEDICARE

## 2024-07-15 DIAGNOSIS — Z01.818 PRE-OP TESTING: ICD-10-CM

## 2024-07-15 LAB
ANION GAP SERPL CALC-SCNC: 8 MMOL/L (ref 0–18)
ANTIBODY SCREEN: NEGATIVE
BASOPHILS # BLD AUTO: 0.04 X10(3) UL (ref 0–0.2)
BASOPHILS NFR BLD AUTO: 0.6 %
BUN BLD-MCNC: 15 MG/DL (ref 9–23)
BUN/CREAT SERPL: 20.3 (ref 10–20)
CALCIUM BLD-MCNC: 9.4 MG/DL (ref 8.7–10.4)
CHLORIDE SERPL-SCNC: 112 MMOL/L (ref 98–112)
CO2 SERPL-SCNC: 27 MMOL/L (ref 21–32)
CREAT BLD-MCNC: 0.74 MG/DL
DEPRECATED RDW RBC AUTO: 48.1 FL (ref 35.1–46.3)
EGFRCR SERPLBLD CKD-EPI 2021: 82 ML/MIN/1.73M2 (ref 60–?)
EOSINOPHIL # BLD AUTO: 0.11 X10(3) UL (ref 0–0.7)
EOSINOPHIL NFR BLD AUTO: 1.7 %
ERYTHROCYTE [DISTWIDTH] IN BLOOD BY AUTOMATED COUNT: 14.5 % (ref 11–15)
FASTING STATUS PATIENT QL REPORTED: NO
GLUCOSE BLD-MCNC: 108 MG/DL (ref 70–99)
HCT VFR BLD AUTO: 42.9 %
HGB BLD-MCNC: 14.1 G/DL
IMM GRANULOCYTES # BLD AUTO: 0.01 X10(3) UL (ref 0–1)
IMM GRANULOCYTES NFR BLD: 0.2 %
LYMPHOCYTES # BLD AUTO: 1.31 X10(3) UL (ref 1–4)
LYMPHOCYTES NFR BLD AUTO: 19.8 %
MCH RBC QN AUTO: 29.8 PG (ref 26–34)
MCHC RBC AUTO-ENTMCNC: 32.9 G/DL (ref 31–37)
MCV RBC AUTO: 90.7 FL
MONOCYTES # BLD AUTO: 0.65 X10(3) UL (ref 0.1–1)
MONOCYTES NFR BLD AUTO: 9.8 %
MRSA DNA SPEC QL NAA+PROBE: NEGATIVE
NEUTROPHILS # BLD AUTO: 4.5 X10 (3) UL (ref 1.5–7.7)
NEUTROPHILS # BLD AUTO: 4.5 X10(3) UL (ref 1.5–7.7)
NEUTROPHILS NFR BLD AUTO: 67.9 %
OSMOLALITY SERPL CALC.SUM OF ELEC: 305 MOSM/KG (ref 275–295)
PLATELET # BLD AUTO: 318 10(3)UL (ref 150–450)
POTASSIUM SERPL-SCNC: 3.7 MMOL/L (ref 3.5–5.1)
RBC # BLD AUTO: 4.73 X10(6)UL
RH BLOOD TYPE: NEGATIVE
SODIUM SERPL-SCNC: 147 MMOL/L (ref 136–145)
WBC # BLD AUTO: 6.6 X10(3) UL (ref 4–11)

## 2024-07-15 PROCEDURE — 85025 COMPLETE CBC W/AUTO DIFF WBC: CPT

## 2024-07-15 PROCEDURE — 86900 BLOOD TYPING SEROLOGIC ABO: CPT

## 2024-07-15 PROCEDURE — 87641 MR-STAPH DNA AMP PROBE: CPT

## 2024-07-15 PROCEDURE — 86901 BLOOD TYPING SEROLOGIC RH(D): CPT

## 2024-07-15 PROCEDURE — 36415 COLL VENOUS BLD VENIPUNCTURE: CPT

## 2024-07-15 PROCEDURE — 86850 RBC ANTIBODY SCREEN: CPT

## 2024-07-15 PROCEDURE — 80048 BASIC METABOLIC PNL TOTAL CA: CPT

## 2024-07-23 ENCOUNTER — ANESTHESIA EVENT (OUTPATIENT)
Dept: SURGERY | Facility: HOSPITAL | Age: 79
End: 2024-07-23
Payer: MEDICARE

## 2024-07-24 ENCOUNTER — ANESTHESIA (OUTPATIENT)
Dept: SURGERY | Facility: HOSPITAL | Age: 79
End: 2024-07-24
Payer: MEDICARE

## 2024-07-24 ENCOUNTER — HOSPITAL ENCOUNTER (INPATIENT)
Facility: HOSPITAL | Age: 79
LOS: 2 days | Discharge: HOME | End: 2024-07-26
Attending: ORTHOPAEDIC SURGERY | Admitting: ORTHOPAEDIC SURGERY
Payer: MEDICARE

## 2024-07-24 ENCOUNTER — APPOINTMENT (OUTPATIENT)
Dept: GENERAL RADIOLOGY | Facility: HOSPITAL | Age: 79
End: 2024-07-24
Attending: ORTHOPAEDIC SURGERY
Payer: MEDICARE

## 2024-07-24 DIAGNOSIS — Z01.818 PRE-OP TESTING: Primary | ICD-10-CM

## 2024-07-24 PROBLEM — Z98.1 S/P LUMBAR SPINAL FUSION: Status: ACTIVE | Noted: 2024-07-24

## 2024-07-24 PROCEDURE — 4A11X4G MONITORING OF PERIPHERAL NERVOUS ELECTRICAL ACTIVITY, INTRAOPERATIVE, EXTERNAL APPROACH: ICD-10-PCS | Performed by: ORTHOPAEDIC SURGERY

## 2024-07-24 PROCEDURE — 88300 SURGICAL PATH GROSS: CPT | Performed by: ORTHOPAEDIC SURGERY

## 2024-07-24 PROCEDURE — 0SB20ZZ EXCISION OF LUMBAR VERTEBRAL DISC, OPEN APPROACH: ICD-10-PCS | Performed by: ORTHOPAEDIC SURGERY

## 2024-07-24 PROCEDURE — 76000 FLUOROSCOPY <1 HR PHYS/QHP: CPT | Performed by: ORTHOPAEDIC SURGERY

## 2024-07-24 PROCEDURE — 0SG00J1 FUSION OF LUMBAR VERTEBRAL JOINT WITH SYNTHETIC SUBSTITUTE, POSTERIOR APPROACH, POSTERIOR COLUMN, OPEN APPROACH: ICD-10-PCS | Performed by: ORTHOPAEDIC SURGERY

## 2024-07-24 PROCEDURE — 0SG00A0 FUSION OF LUMBAR VERTEBRAL JOINT WITH INTERBODY FUSION DEVICE, ANTERIOR APPROACH, ANTERIOR COLUMN, OPEN APPROACH: ICD-10-PCS | Performed by: ORTHOPAEDIC SURGERY

## 2024-07-24 PROCEDURE — 0SP004Z REMOVAL OF INTERNAL FIXATION DEVICE FROM LUMBAR VERTEBRAL JOINT, OPEN APPROACH: ICD-10-PCS | Performed by: ORTHOPAEDIC SURGERY

## 2024-07-24 DEVICE — IMPLANTABLE DEVICE: Type: IMPLANTABLE DEVICE | Status: FUNCTIONAL

## 2024-07-24 DEVICE — BONE GRAFT KIT 7510100 INFUSE X SMALL
Type: IMPLANTABLE DEVICE | Site: BACK | Status: FUNCTIONAL
Brand: INFUSE® BONE GRAFT

## 2024-07-24 DEVICE — MAGNETOS PUTTY 10CC 1-2MM USA
Type: IMPLANTABLE DEVICE | Site: BACK | Status: FUNCTIONAL
Brand: MAGNETOS

## 2024-07-24 DEVICE — IMPLANTABLE DEVICE: Type: IMPLANTABLE DEVICE | Site: BACK | Status: FUNCTIONAL

## 2024-07-24 DEVICE — SET SCR SPNL T30 AST TECH: Type: IMPLANTABLE DEVICE | Site: BACK | Status: FUNCTIONAL

## 2024-07-24 RX ORDER — ONDANSETRON 2 MG/ML
INJECTION INTRAMUSCULAR; INTRAVENOUS AS NEEDED
Status: DISCONTINUED | OUTPATIENT
Start: 2024-07-24 | End: 2024-07-24 | Stop reason: SURG

## 2024-07-24 RX ORDER — EPHEDRINE SULFATE 50 MG/ML
INJECTION, SOLUTION INTRAVENOUS AS NEEDED
Status: DISCONTINUED | OUTPATIENT
Start: 2024-07-24 | End: 2024-07-24 | Stop reason: SURG

## 2024-07-24 RX ORDER — ACETAMINOPHEN 500 MG
1000 TABLET ORAL EVERY 8 HOURS SCHEDULED
Status: DISCONTINUED | OUTPATIENT
Start: 2024-07-24 | End: 2024-07-26

## 2024-07-24 RX ORDER — ATORVASTATIN CALCIUM 10 MG/1
10 TABLET, FILM COATED ORAL NIGHTLY
Status: DISCONTINUED | OUTPATIENT
Start: 2024-07-24 | End: 2024-07-26

## 2024-07-24 RX ORDER — DOCUSATE SODIUM 100 MG/1
100 CAPSULE, LIQUID FILLED ORAL 2 TIMES DAILY
Status: DISCONTINUED | OUTPATIENT
Start: 2024-07-24 | End: 2024-07-26

## 2024-07-24 RX ORDER — SODIUM CHLORIDE, SODIUM LACTATE, POTASSIUM CHLORIDE, CALCIUM CHLORIDE 600; 310; 30; 20 MG/100ML; MG/100ML; MG/100ML; MG/100ML
INJECTION, SOLUTION INTRAVENOUS CONTINUOUS
Status: DISCONTINUED | OUTPATIENT
Start: 2024-07-24 | End: 2024-07-26

## 2024-07-24 RX ORDER — KETAMINE HYDROCHLORIDE 50 MG/ML
INJECTION, SOLUTION INTRAMUSCULAR; INTRAVENOUS AS NEEDED
Status: DISCONTINUED | OUTPATIENT
Start: 2024-07-24 | End: 2024-07-24 | Stop reason: SURG

## 2024-07-24 RX ORDER — ASCORBIC ACID 500 MG
1000 TABLET ORAL
Status: DISCONTINUED | OUTPATIENT
Start: 2024-07-24 | End: 2024-07-25

## 2024-07-24 RX ORDER — SODIUM CHLORIDE 9 MG/ML
INJECTION, SOLUTION INTRAVENOUS CONTINUOUS PRN
Status: DISCONTINUED | OUTPATIENT
Start: 2024-07-24 | End: 2024-07-24 | Stop reason: SURG

## 2024-07-24 RX ORDER — METHOCARBAMOL 500 MG/1
250 TABLET, FILM COATED ORAL EVERY 6 HOURS PRN
Status: DISCONTINUED | OUTPATIENT
Start: 2024-07-24 | End: 2024-07-26

## 2024-07-24 RX ORDER — HYDRALAZINE HYDROCHLORIDE 20 MG/ML
INJECTION INTRAMUSCULAR; INTRAVENOUS AS NEEDED
Status: DISCONTINUED | OUTPATIENT
Start: 2024-07-24 | End: 2024-07-24 | Stop reason: SURG

## 2024-07-24 RX ORDER — BUPIVACAINE HYDROCHLORIDE AND EPINEPHRINE 2.5; 5 MG/ML; UG/ML
INJECTION, SOLUTION INFILTRATION; PERINEURAL AS NEEDED
Status: DISCONTINUED | OUTPATIENT
Start: 2024-07-24 | End: 2024-07-24 | Stop reason: HOSPADM

## 2024-07-24 RX ORDER — MAGNESIUM CARB/ALUMINUM HYDROX 105-160MG
296 TABLET,CHEWABLE ORAL ONCE AS NEEDED
Status: ACTIVE | OUTPATIENT
Start: 2024-07-24 | End: 2024-07-24

## 2024-07-24 RX ORDER — SENNOSIDES 8.6 MG
17.2 TABLET ORAL NIGHTLY
Status: DISCONTINUED | OUTPATIENT
Start: 2024-07-24 | End: 2024-07-26

## 2024-07-24 RX ORDER — MORPHINE SULFATE 1 MG/ML
INJECTION, SOLUTION EPIDURAL; INTRATHECAL; INTRAVENOUS AS NEEDED
Status: DISCONTINUED | OUTPATIENT
Start: 2024-07-24 | End: 2024-07-24 | Stop reason: HOSPADM

## 2024-07-24 RX ORDER — NALOXONE HYDROCHLORIDE 0.4 MG/ML
0.08 INJECTION, SOLUTION INTRAMUSCULAR; INTRAVENOUS; SUBCUTANEOUS
Status: DISCONTINUED | OUTPATIENT
Start: 2024-07-24 | End: 2024-07-26

## 2024-07-24 RX ORDER — POLYETHYLENE GLYCOL 3350 17 G/17G
17 POWDER, FOR SOLUTION ORAL DAILY
Status: DISCONTINUED | OUTPATIENT
Start: 2024-07-24 | End: 2024-07-26

## 2024-07-24 RX ORDER — DIPHENHYDRAMINE HYDROCHLORIDE 50 MG/ML
25 INJECTION INTRAMUSCULAR; INTRAVENOUS EVERY 4 HOURS PRN
Status: DISCONTINUED | OUTPATIENT
Start: 2024-07-24 | End: 2024-07-26

## 2024-07-24 RX ORDER — KETOROLAC TROMETHAMINE 30 MG/ML
INJECTION, SOLUTION INTRAMUSCULAR; INTRAVENOUS AS NEEDED
Status: DISCONTINUED | OUTPATIENT
Start: 2024-07-24 | End: 2024-07-24 | Stop reason: SURG

## 2024-07-24 RX ORDER — DEXAMETHASONE SODIUM PHOSPHATE 4 MG/ML
VIAL (ML) INJECTION AS NEEDED
Status: DISCONTINUED | OUTPATIENT
Start: 2024-07-24 | End: 2024-07-24 | Stop reason: SURG

## 2024-07-24 RX ORDER — ENEMA 19; 7 G/133ML; G/133ML
1 ENEMA RECTAL ONCE AS NEEDED
Status: DISCONTINUED | OUTPATIENT
Start: 2024-07-24 | End: 2024-07-26

## 2024-07-24 RX ORDER — VANCOMYCIN HYDROCHLORIDE 1 G/20ML
INJECTION, POWDER, LYOPHILIZED, FOR SOLUTION INTRAVENOUS AS NEEDED
Status: DISCONTINUED | OUTPATIENT
Start: 2024-07-24 | End: 2024-07-24 | Stop reason: HOSPADM

## 2024-07-24 RX ORDER — SODIUM CHLORIDE, SODIUM LACTATE, POTASSIUM CHLORIDE, CALCIUM CHLORIDE 600; 310; 30; 20 MG/100ML; MG/100ML; MG/100ML; MG/100ML
INJECTION, SOLUTION INTRAVENOUS CONTINUOUS
Status: DISCONTINUED | OUTPATIENT
Start: 2024-07-24 | End: 2024-07-24 | Stop reason: HOSPADM

## 2024-07-24 RX ORDER — ATENOLOL 25 MG/1
25 TABLET ORAL
Status: DISCONTINUED | OUTPATIENT
Start: 2024-07-25 | End: 2024-07-26

## 2024-07-24 RX ORDER — KETOROLAC TROMETHAMINE 15 MG/ML
15 INJECTION, SOLUTION INTRAMUSCULAR; INTRAVENOUS EVERY 6 HOURS PRN
Status: ACTIVE | OUTPATIENT
Start: 2024-07-24 | End: 2024-07-26

## 2024-07-24 RX ORDER — BISACODYL 10 MG
10 SUPPOSITORY, RECTAL RECTAL
Status: DISCONTINUED | OUTPATIENT
Start: 2024-07-24 | End: 2024-07-26

## 2024-07-24 RX ORDER — SCOLOPAMINE TRANSDERMAL SYSTEM 1 MG/1
1 PATCH, EXTENDED RELEASE TRANSDERMAL
Status: DISCONTINUED | OUTPATIENT
Start: 2024-07-24 | End: 2024-07-24 | Stop reason: HOSPADM

## 2024-07-24 RX ORDER — ACETAMINOPHEN 500 MG
1000 TABLET ORAL ONCE
Status: COMPLETED | OUTPATIENT
Start: 2024-07-24 | End: 2024-07-24

## 2024-07-24 RX ORDER — ONDANSETRON 2 MG/ML
4 INJECTION INTRAMUSCULAR; INTRAVENOUS EVERY 6 HOURS PRN
Status: DISCONTINUED | OUTPATIENT
Start: 2024-07-24 | End: 2024-07-26

## 2024-07-24 RX ORDER — METOCLOPRAMIDE HYDROCHLORIDE 5 MG/ML
10 INJECTION INTRAMUSCULAR; INTRAVENOUS EVERY 8 HOURS PRN
Status: DISCONTINUED | OUTPATIENT
Start: 2024-07-24 | End: 2024-07-26

## 2024-07-24 RX ORDER — DIPHENHYDRAMINE HCL 25 MG
25 CAPSULE ORAL EVERY 4 HOURS PRN
Status: DISCONTINUED | OUTPATIENT
Start: 2024-07-24 | End: 2024-07-26

## 2024-07-24 RX ORDER — NALOXONE HYDROCHLORIDE 0.4 MG/ML
0.08 INJECTION, SOLUTION INTRAMUSCULAR; INTRAVENOUS; SUBCUTANEOUS AS NEEDED
Status: DISCONTINUED | OUTPATIENT
Start: 2024-07-24 | End: 2024-07-24 | Stop reason: HOSPADM

## 2024-07-24 RX ORDER — HALOPERIDOL 5 MG/ML
1 INJECTION INTRAMUSCULAR ONCE AS NEEDED
Status: COMPLETED | OUTPATIENT
Start: 2024-07-24 | End: 2024-07-24

## 2024-07-24 RX ADMIN — SODIUM CHLORIDE: 9 INJECTION, SOLUTION INTRAVENOUS at 08:36:00

## 2024-07-24 RX ADMIN — HYDRALAZINE HYDROCHLORIDE 10 MG: 20 INJECTION INTRAMUSCULAR; INTRAVENOUS at 08:54:00

## 2024-07-24 RX ADMIN — ONDANSETRON 4 MG: 2 INJECTION INTRAMUSCULAR; INTRAVENOUS at 12:24:00

## 2024-07-24 RX ADMIN — SCOLOPAMINE TRANSDERMAL SYSTEM 1 PATCH: 1 PATCH, EXTENDED RELEASE TRANSDERMAL at 09:17:00

## 2024-07-24 RX ADMIN — SODIUM CHLORIDE, SODIUM LACTATE, POTASSIUM CHLORIDE, CALCIUM CHLORIDE: 600; 310; 30; 20 INJECTION, SOLUTION INTRAVENOUS at 09:28:00

## 2024-07-24 RX ADMIN — KETAMINE HYDROCHLORIDE 25 MG: 50 INJECTION, SOLUTION INTRAMUSCULAR; INTRAVENOUS at 12:41:00

## 2024-07-24 RX ADMIN — DEXAMETHASONE SODIUM PHOSPHATE 10 MG: 4 MG/ML VIAL (ML) INJECTION at 08:45:00

## 2024-07-24 RX ADMIN — SODIUM CHLORIDE, SODIUM LACTATE, POTASSIUM CHLORIDE, CALCIUM CHLORIDE: 600; 310; 30; 20 INJECTION, SOLUTION INTRAVENOUS at 11:24:00

## 2024-07-24 RX ADMIN — EPHEDRINE SULFATE 10 MG: 50 INJECTION, SOLUTION INTRAVENOUS at 08:42:00

## 2024-07-24 RX ADMIN — KETOROLAC TROMETHAMINE 30 MG: 30 INJECTION, SOLUTION INTRAMUSCULAR; INTRAVENOUS at 12:41:00

## 2024-07-24 RX ADMIN — ONDANSETRON 4 MG: 2 INJECTION INTRAMUSCULAR; INTRAVENOUS at 08:46:00

## 2024-07-24 RX ADMIN — HALOPERIDOL 1 MG: 5 INJECTION INTRAMUSCULAR at 11:59:00

## 2024-07-24 NOTE — ANESTHESIA PROCEDURE NOTES
Airway  Date/Time: 7/24/2024 8:03 AM  Urgency: elective      General Information and Staff    Patient location during procedure: OR  Anesthesiologist: Bhupendra Mckinney MD  Performed: anesthesiologist   Performed by: Bhupendra Mckinney MD  Authorized by: Bhupendra Mckinney MD      Indications and Patient Condition  Indications for airway management: anesthesia  Sedation level: deep  Preoxygenated: yes  Patient position: sniffing  Mask difficulty assessment: 1 - vent by mask    Final Airway Details  Final airway type: endotracheal airway      Successful airway: ETT  Cuffed: yes   Successful intubation technique: direct laryngoscopy  Endotracheal tube insertion site: oral  Blade: Lucy  Blade size: #3  ETT size (mm): 7.0    Cormack-Lehane Classification: grade IIB - view of arytenoids or posterior of glottis only  Placement verified by: capnometry   Measured from: lips  Number of attempts at approach: 1

## 2024-07-24 NOTE — ANESTHESIA PROCEDURE NOTES
Arterial Line    Date/Time: 7/24/2024 8:14 AM    Performed by: Bhupendra Mckinney MD  Authorized by: Bhupendra Mckinney MD    General Information and Staff    Procedure Start:  7/24/2024 8:14 AM  Procedure End:  7/24/2024 8:16 AM  Anesthesiologist:  Bhupendra Mckinney MD  Performed By:  Anesthesiologist  Patient Location:  OR  Indication: continuous blood pressure monitoring and blood sampling needed    Site Identification: real time ultrasound guided    Preanesthetic Checklist: 2 patient identifiers, IV checked, risks and benefits discussed, monitors and equipment checked, pre-op evaluation, timeout performed, anesthesia consent and sterile technique used    Procedure Details    Catheter Size:  20 G  Catheter Length:  1 and 3/4 inch  Catheter Type:  Arrow  Seldinger Technique?: Yes    Laterality:  Right  Site:  Radial artery  Site Prep: chlorhexidine    Line Secured:  Wrist Brace, tape and Tegaderm    Assessment    Events: patient tolerated procedure well with no complications      Medications  7/24/2024 8:14 AM      Additional Comments

## 2024-07-24 NOTE — PLAN OF CARE
Rec'd pt from PACU this afternoon s/p laminectomy with fusion by Dr. Raines.   Vital signs monitored. Tolerating diet. Street draining clear yellow urine. Cough/deep breathe, encouraging incentive spirometry. Safety precautions in place- bed in lowest position, call light and personal belongings within reach.   Frequent rounding by nursing staff.

## 2024-07-24 NOTE — OPERATIVE REPORT
Pre-postop dx:  spinal stenosis with adjacent segment degen, retained HW from L4-5  Proc:  right L2-3 XLIF, mirus cage, PSF L2-3, left L2-3 laminotomy, Spinecraft inst. L2-5, Magnetos graft, intrathecal block  Cam[place]/Jelani  GETA  Eb: 100 ml  Drains; none  Complications: none  To RR stable

## 2024-07-24 NOTE — H&P
[facility] Hospitalist H&P       CC: No chief complaint on file.       PCP: Naatlie Marshall MD    History of Present Illness: Patient is a 79 year old female with PMH sig for pAF on xarelto, HTN, and HLD who presented to the hospital for lumbar fusion and laminectomy. Patient was seen and examined post operatively in PACU.She was still very out of it from anesthesia and unable to participate in exam. Vitals stable. Per nurse at bedside, no intra-op complications.       PMH  Past Medical History:    Arrhythmia    AFib    Arthritis of facet joint of lumbar spine    Back problem    Bulging disc    C5-6    Cataract    Cervical spinal stenosis    C5-6, C6-7    Chickenpox    DJD (degenerative joint disease), cervical    Esophageal reflux    Hemorrhoids    High blood pressure    High cholesterol    Hypercholesterolemia    Hypothenar muscle atrophy    R hand    Impaired vision    Internal hemorrhoids    Kidney stone    small right    Lordosis    cervical spine    Lumbosacral spondylosis without myelopathy    Measles    Osteoarthritis    PAF (paroxysmal atrial fibrillation) (HCC)    Pneumonia    Primary osteoarthritis of right foot    s/p right L3-L5 hardware removal; 3/9/2021    Sensorineural hearing loss    Urinary frequency    Vaginal cyst    Vertigo    Visual impairment    readers    Whooping cough        PSH  Past Surgical History:   Procedure Laterality Date    Back surgery  11/21/2017    Back surgery  06/02/2020    S/P Revision L3-L4 MIS TLIF/PLF, exchange of HW with L3-L4 Fusion    Breast surgery procedure unlisted  1995    L breast bx-benign    Cataract      Colonoscopy      Excisional biopsy left  1995    duct excision    Laser surgery of eye  2001    LASIK    Gabriela localization wire 1 site left (cpt=19281)      1993 bn    Needle biopsy left      Other surgical history  02/23/1999    Flexible sigmoidoscopy    Spine surgery procedure unlisted      Spine surgery procedure unlisted  07/24/2024    right  side Lumbar 2 - 3 extreme lateral interbody fusion with posterior spinal fusion Lumbar 2 - 3 laminotomy left side, Lumbar 2 - 3, Lumbar 3 - 4 instrumented fusion - Yanna        ALL:  Allergies   Allergen Reactions    Codeine NAUSEA AND VOMITING    Morphine ANAPHYLAXIS and NAUSEA AND VOMITING    Oxycodone NAUSEA AND VOMITING     severe    Clonidine OTHER (SEE COMMENTS)     Reaction: dry mouth          Home Medications:  Outpatient Medications Marked as Taking for the 7/24/24 encounter (Hospital Encounter)   Medication Sig Dispense Refill    acetaminophen 500 MG Oral Tab Take 1 tablet (500 mg total) by mouth every 6 (six) hours as needed for Pain.      Ascorbic Acid (VITAMIN C) 1000 MG Oral Tab Take 1 tablet (1,000 mg total) by mouth 2 (two) times daily. 60 tablet 0    XARELTO 20 MG Oral Tab Take 1 tablet (20 mg total) by mouth daily with food. 90 tablet 3    simvastatin 20 MG Oral Tab Take 1 tablet (20 mg total) by mouth nightly. 90 tablet 3    atenolol 25 MG Oral Tab Take 1 tablet (25 mg total) by mouth once daily. 90 tablet 3    Multiple Vitamins-Minerals (MULTIVITAL) Oral Chew Tab Chew by mouth daily.           Soc Hx  Social History     Tobacco Use    Smoking status: Never    Smokeless tobacco: Never   Substance Use Topics    Alcohol use: No     Alcohol/week: 0.0 standard drinks of alcohol        Fam Hx  Family History   Problem Relation Age of Onset    Hypertension Father     Heart Disease Father     Other (CHF) Father     Cancer Mother         stomach ca    Breast Cancer Paternal Grandmother 70        age of dx 70    Alcohol and Other Disorders Associated Paternal Grandfather     Other (Autism) Son     Gastro-Intestinal Disorder Brother         Crohn's disease       Review of Systems  Comprehensive ROS reviewed and negative except for what's stated above.     OBJECTIVE:  /57   Pulse 68   Temp 97.4 °F (36.3 °C) (Temporal)   Resp 15   Ht 5' 1\" (1.549 m)   Wt 122 lb (55.3 kg)   LMP  (LMP Unknown)    SpO2 97%   BMI 23.05 kg/m²   General:  Sleeping   Head:  Normocephalic,               Neck: Supple   Lungs:   Clear to auscultation bilaterally.        Heart:  Regular rate and rhythm, S1, S2 normal,    Abdomen:   Soft, brace on   Extremities: Extremities normal,              Diagnostic Data:    CBC/Chem  No results for input(s): \"WBC\", \"HGB\", \"MCV\", \"PLT\", \"BAND\", \"INR\" in the last 168 hours.    Invalid input(s): \"LYM#\", \"MONO#\", \"BASOS#\", \"EOSIN#\"    No results for input(s): \"NA\", \"K\", \"CL\", \"CO2\", \"BUN\", \"CREATSERUM\", \"GLU\", \"CA\", \"CAION\", \"MG\", \"PHOS\" in the last 168 hours.    No results for input(s): \"ALT\", \"AST\", \"ALB\", \"AMYLASE\", \"LIPASE\", \"LDH\" in the last 168 hours.    Invalid input(s): \"ALPHOS\", \"TBIL\", \"DBIL\", \"TPROT\"    No results for input(s): \"TROP\" in the last 168 hours.    Radiology: No results found.      ASSESSMENT / PLAN:   Patient is a 79 year old female with PMH sig for pAF on xarelto, HTN, and HLD who presented to the hospital for lumbar fusion and laminectomy..      Spinal stenosis  Retained HW from L4-L5  - s/p lumbar laminectomy and fusion POD # 0   - prn pain medication   - monitor respiratory status  - encouraged IS use  - prn anti emetics  - CBC in the morning  - PT/SW  - dvt ppx: SCD  - rest of management per ortho    pAF  Secondary hypercoagulable state  - resume xarelto when ok with ortho  - atenelol 25 mg daily    HTN  HLD  - simvastatin, atenelol    FN:  - IVF: in pacu  - Diet: npo    DVT Prophy: SCD  Lines: PIV    Dispo: pending clinical course    Outpatient records or previous hospital records reviewed.     Further recommendations pending patient's clinical course.  [facility] hospitalist to continue to follow patient while in house    Patient and/or patient's family given opportunity to ask questions and note understanding and agreeing with therapeutic plan as outlined    Thank You,  Cindy Forrest DO    Hospitalist with [facility]  Answering Service number:  [phone]

## 2024-07-24 NOTE — H&P
Kanwal Mason is a 79 year old female who I assessed as follows:  Patient presents with:  Cardiac Clearance: L2-3 lateral fusion with posterior fusion L2-3, instrumentation L2-3-4 with Dr. Gabriel Raines @ [facility] on 7/24/24.      MEDICAL DECISION MAKING:     Has PAF, asymptomatic.     Low risk for cardiac complications michael-operatively for upcoming lumbar spine surgery. Hold xarelto for 3 days prior.    ASSESSMENT/PLAN:     Pre-operative cardiovascular examination (primary encounter diagnosis)  Paroxysmal atrial fibrillation (hcc)  Hypercholesterolemia  Primary hypertension  Secondary hypercoagulable state (hcc)    PLAN:  Event monitor to look for recurrence PAF; noted, so long-term anticoagulation   Continue statin  Continue BP meds; list reviewed  RTC 1 year (Dec 2024)  Discussed with  at visit  Low risk for cardiac complications michael-operatively for upcoming lumbar spine surgery. Hold xarelto for 3 days prior.    Orders This Visit:  No orders of the defined types were placed in this encounter.    Meds This Visit:  Requested Prescriptions     No prescriptions requested or ordered in this encounter     --------------------------------------------------------------------------------------------------------------------------------    HPI:   Patient presents with:  Cardiac Clearance: L2-3 lateral fusion with posterior fusion L2-3, instrumentation L2-3-4 with Dr. Gabriel Raines @ [facility] on 7/24/24.        Had lumbar spine surgery 2017 and noted to have PAF afterward, then back in SR.     No palpitations, chest pain, dyspnea, syncope since. On xarelto without bleeding issues. On statin. BP controlled.    Event monitor April 2018 with PAF, asymptomatic.    Had lumbar spine surgery last year and then again to have later this month.     at visit.    HISTORY:  Past Medical History:   Diagnosis Date   Cataract   Cervical spinal stenosis   C5-6, C6-7   Chronic right-sided low back pain  without sciatica 11/12/2018   Diverticulosis 06/07/2005   DJD (degenerative joint disease), cervical   Esophageal reflux   High blood pressure   High cholesterol   Lumbar post-laminectomy syndrome 11/06/2017   Seeing , getting kristine.   Neuropathy   bilat feet   Osteoarthritis   PAF (paroxysmal atrial fibrillation) (HCC)   Post-operative nausea and vomiting   right L5-S1 MIS TLIF, add on fusion; 1/10/2023 06/11/2020   Sacroiliac joint dysfunction of right side 03/24/2022     Past Surgical History:   Procedure Laterality Date   BACK SURGERY 11/21/2017   Spinal Fusion L3-L4   BACK SURGERY 06/02/2020   Spinal fusion L4-L5S/P Revision L3-L4 MIS TLIF/PLF, exchange of HW with L3-L4 Fusion   BACK SURGERY Bilateral 08/02/2022   Spine Spur L4,4,5) and Rt L5-S1 hemilami   BACK SURGERY Bilateral 01/10/2023   Dpinsl gudion L4-L5   BREAST SURGERY PROCEDURE UNLISTED 1995   L breast bx-benign   CATARACT   COLONOSCOPY   EXCISIONAL BIOPSY LEFT 1995   duct excision   LASER SURGERY OF EYE 2001   LASIK   QUINN LOCALIZATION WIRE 1 SITE LEFT (CPT=19281)   1993 bn   NEEDLE BIOPSY LEFT   OTHER SURGICAL HISTORY 02/23/1999   Flexible sigmoidoscopy   OTHER SURGICAL HISTORY 05/22/2023   Cysto-Dr. Cordova   SPINE SURGERY PROCEDURE UNLISTED     Family History   Problem Relation Age of Onset   Hypertension Father   Heart Disease Father   Other (CHF) Father   Cancer Mother   stomach ca   Breast Cancer Paternal Grandmother 70   age of dx 70   Alcohol and Other Disorders Associated Paternal Grandfather   Other (Autism) Son   Gastro-Intestinal Disorder Brother   Crohn's disease     Social History: Social History  Tobacco Use  Smoking status: Never  Smokeless tobacco: Never  Vaping Use  Vaping status: Never Used  Alcohol use: No  Alcohol/week: 0.0 standard drinks of alcohol  Drug use: Never        Medications (Active prior to today's visit):  Current Outpatient Medications   Medication Sig Dispense Refill   methocarbamol 500 MG Oral Tab Take 1  tablet (500 mg total) by mouth every 6 (six) hours as needed. 40 tablet 2   fluticasone propionate 50 MCG/ACT Nasal Suspension 2 sprays by Each Nare route daily. 1 each 3   Doxepin HCl 5 % External Cream Apply 0.5 g topically 2 (two) times a day. Apply to itchy areas 45 g 2   atenolol 25 MG Oral Tab Take 1 tablet (25 mg total) by mouth once daily. 90 tablet 3   simvastatin 40 MG Oral Tab Take 1 tablet (40 mg total) by mouth nightly. 90 tablet 3   rivaroxaban (XARELTO) 20 MG Oral Tab Take 1 tablet (20 mg total) by mouth daily with food. 90 tablet 3     Allergies:    Codeine NAUSEA AND VOMITING  Morphine ANAPHYLAXIS, NAUSEA AND VOMITING  Other ANAPHYLAXIS  Oxycodone NAUSEA AND VOMITING  Comment:severe  Tramadol NAUSEA AND VOMITING  Norco [Hydrocodone-* NAUSEA AND VOMITING  Aspirin OTHER (SEE COMMENTS)  Comment:Blood thinner  Ibuprofen OTHER (SEE COMMENTS)  Clonidine OTHER (SEE COMMENTS)  Comment:Reaction: dry mouth    ROS:     CONSTITUTIONAL: No fever or chills  ALLERGIES: no new allergies  CARDIOVASCULAR: no PND, orthopnea, or syncope  ENDOCRINE: No change in sleep patterns  EYES: No recent vision changes   THROAT: no sore throat  RESPIRATORY: No recent cough, dyspnea or wheezing  GASTROINTESTINAL: No abdominal pain, nausea or emesis  HEMATOLOGY: No easy bruising  MUSCULOSKELETAL: arthritis; awaiting lumbar spine surgery  NEUROLOGICAL: No new stroke symptoms  PSYCH: no recent depression    PHYSICAL EXAM:   /75  Pulse 59  Resp 14  LMP (LMP Unknown)  SpO2 97%   Wt Readings from Last 3 Encounters:  05/03/24 : 126 lb 3.2 oz (57.2 kg)  03/07/24 : 123 lb 6.4 oz (56 kg)  02/19/24 : 124 lb 9.6 oz (56.5 kg)    GENERAL: well developed, well nourished, in no apparent distress  SKIN: no rashes  HEENT: atraumatic, normocephalic  NECK: supple, no bruits  LUNGS: clear to auscultation  CARDIO: RRR without murmur or S3   GI: soft, nondistended  EXTREMITIES: no cyanosis, clubbing or edema  NEUROLOGY: alert  PSYCH:  cooperative      ASSESSMENT & PLAN: Herniated disk, adjacent segment degeneration at L2-L3 above a previously solid fusion. The patient would like to proceed with surgery. This would be a right lateral fusion at L2-3 with Mirus cage and posterior fixation and fusion L2-3 with left L2-3 hemilaminotomy. Risks, benefits and alternatives were explained to the patient in detail. She would like to proceed. She understands there is always a risk that this could progress to the L1-2 segment. She understands that she will be in the hospital 2 to 3 days and then will go to rehab for approximately 1 week postop and wear a brace for 10 weeks postop.

## 2024-07-24 NOTE — ANESTHESIA PREPROCEDURE EVALUATION
Anesthesia PreOp Note    HPI:     Kanwal Mason is a 79 year old female who presents for preoperative consultation requested by: Gabriel Raines MD    Date of Surgery: 7/24/2024    Procedure(s):  Right side Lumbar 2 - 3 extreme lateral interbody fusion with posterior spinal fusion Lumbar 2 - 3 laminotomy left side, Lumbar 2 - 3, Lumbar 3 - 4 instrumented fusion  POSTERIOR LUMBAR LAMINECT SPINAL FUS W/INSTR 2 LEV  Indication: Painful hardware, stenosis, disc herniation lumbar spine    Relevant Problems   No relevant active problems       NPO:  Last Liquid Consumption Date: 07/23/24  Last Liquid Consumption Time: 2330  Last Solid Consumption Date: 07/23/24  Last Solid Consumption Time: 2000  Last Liquid Consumption Date: 07/23/24          History Review:  Patient Active Problem List    Diagnosis Date Noted    Sacroiliac joint dysfunction of right side 03/24/2022    Secondary hypercoagulable state (HCC) 12/07/2020    S/P lumbar fusion 06/11/2020    Other osteoporosis without current pathological fracture 11/06/2019    Aortic ectasia (HCC) 02/03/2019    Chronic right-sided low back pain without sciatica 11/12/2018    Paroxysmal atrial fibrillation (HCC) 12/08/2017    Anticoagulated for AFIB  12/08/2017    Lumbar foraminal stenosis 12/05/2017    Lumbar post-laminectomy syndrome 11/06/2017    Painful orthopaedic hardware (HCC) 06/13/2017    Optic neuropathy, 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    Hypercholesterolemia     HTN (hypertension)        Past Medical History:    Arrhythmia    AFib    Arthritis of facet joint of lumbar spine    Back problem    Bulging disc    C5-6    Cataract    Cervical spinal stenosis    C5-6, C6-7    Chickenpox    DJD (degenerative joint disease), cervical    Esophageal reflux    Hemorrhoids    High blood pressure    High cholesterol    Hypercholesterolemia    Hypothenar muscle atrophy    R hand    Impaired vision    Internal hemorrhoids    Kidney stone    small right    Lordosis    cervical spine    Lumbosacral  spondylosis without myelopathy    Measles    Osteoarthritis    PAF (paroxysmal atrial fibrillation) (HCC)    Pneumonia    Primary osteoarthritis of right foot    s/p right L3-L5 hardware removal; 3/9/2021    Sensorineural hearing loss    Urinary frequency    Vaginal cyst    Vertigo    Visual impairment    readers    Whooping cough       Past Surgical History:   Procedure Laterality Date    Back surgery  11/21/2017    Back surgery  06/02/2020    S/P Revision L3-L4 MIS TLIF/PLF, exchange of HW with L3-L4 Fusion    Breast surgery procedure unlisted  1995    L breast bx-benign    Cataract      Colonoscopy      Excisional biopsy left  1995    duct excision    Laser surgery of eye  2001    LASIK    Gabriela localization wire 1 site left (cpt=19281)      1993 bn    Needle biopsy left      Other surgical history  2/23/1999    Flexible sigmoidoscopy    Spine surgery procedure unlisted         Medications Prior to Admission   Medication Sig Dispense Refill Last Dose    acetaminophen 500 MG Oral Tab Take 1 tablet (500 mg total) by mouth every 6 (six) hours as needed for Pain.   7/19/2024    Ascorbic Acid (VITAMIN C) 1000 MG Oral Tab Take 1 tablet (1,000 mg total) by mouth 2 (two) times daily. 60 tablet 0 7/9/2024    XARELTO 20 MG Oral Tab Take 1 tablet (20 mg total) by mouth daily with food. 90 tablet 3 7/19/2024    simvastatin 20 MG Oral Tab Take 1 tablet (20 mg total) by mouth nightly. 90 tablet 3 7/23/2024 at 2000    atenolol 25 MG Oral Tab Take 1 tablet (25 mg total) by mouth once daily. 90 tablet 3 7/24/2024 at 0430    Multiple Vitamins-Minerals (MULTIVITAL) Oral Chew Tab Chew by mouth daily.   7/9/2024     Current Facility-Administered Medications Ordered in Epic   Medication Dose Route Frequency Provider Last Rate Last Admin    lactated ringers infusion   Intravenous Continuous Gabriel Raines MD 20 mL/hr at 07/24/24 0631 New Bag at 07/24/24 0631    metoprolol tartrate (Lopressor) tab 25 mg  25 mg Oral Once PRN Yanna  MD Gabriel        ceFAZolin (Ancef) 2g in 10mL IV syringe premix  2 g Intravenous Once Gabriel Raines MD         No current [facility]-ordered outpatient medications on file.       Allergies   Allergen Reactions    Codeine NAUSEA AND VOMITING    Morphine ANAPHYLAXIS and NAUSEA AND VOMITING    Oxycodone NAUSEA AND VOMITING     severe    Clonidine OTHER (SEE COMMENTS)     Reaction: dry mouth         Family History   Problem Relation Age of Onset    Hypertension Father     Heart Disease Father     Other (CHF) Father     Cancer Mother         stomach ca    Breast Cancer Paternal Grandmother 70        age of dx 70    Alcohol and Other Disorders Associated Paternal Grandfather     Other (Autism) Son     Gastro-Intestinal Disorder Brother         Crohn's disease     Social History     Socioeconomic History    Marital status:     Number of children: 3   Occupational History    Occupation: retired teacher     Comment: health and PE    Occupation:    Tobacco Use    Smoking status: Never    Smokeless tobacco: Never   Vaping Use    Vaping status: Never Used   Substance and Sexual Activity    Alcohol use: No     Alcohol/week: 0.0 standard drinks of alcohol    Drug use: Never    Sexual activity: Not Currently     Partners: Male   Other Topics Concern    Caffeine Concern Yes    Exercise Yes     Comment: hike 1 x week    Seat Belt Yes       Available pre-op labs reviewed.  Lab Results   Component Value Date    WBC 6.6 07/15/2024    RBC 4.73 07/15/2024    HGB 14.1 07/15/2024    HCT 42.9 07/15/2024    MCV 90.7 07/15/2024    MCH 29.8 07/15/2024    MCHC 32.9 07/15/2024    RDW 14.5 07/15/2024    .0 07/15/2024     Lab Results   Component Value Date     (H) 07/15/2024    K 3.7 07/15/2024     07/15/2024    CO2 27.0 07/15/2024    BUN 15 07/15/2024    CREATSERUM 0.74 07/15/2024     (H) 07/15/2024    CA 9.4 07/15/2024          Vital Signs:  Body mass index is 23.05 kg/m².   height is 1.549 m (5'  1\") and weight is 55.3 kg (122 lb). Her oral temperature is 98.7 °F (37.1 °C). Her blood pressure is 164/79 (abnormal) and her pulse is 61. Her respiration is 16 and oxygen saturation is 96%.   Vitals:    07/09/24 1818 07/24/24 0552   BP:  (!) 164/79   Pulse:  61   Resp:  16   Temp:  98.7 °F (37.1 °C)   TempSrc:  Oral   SpO2:  96%   Weight: 54.4 kg (120 lb) 55.3 kg (122 lb)   Height: 1.549 m (5' 1\") 1.549 m (5' 1\")        Anesthesia Evaluation      No history of anesthetic complications   Airway   Mallampati: II  TM distance: >3 FB  Neck ROM: full  Dental      Pulmonary     breath sounds clear to auscultation  (-) COPD, asthma, sleep apnea, decreased breath sounds, wheezes  Cardiovascular   Exercise tolerance: good  (+) hypertension, dysrhythmias  (-) pacemaker, past MI, murmur    Rhythm: regular  Rate: normal    Neuro/Psych    (+)  neuromuscular disease, anxiety/panic attacks,      (-) seizures    GI/Hepatic/Renal    (+) GERD    Endo/Other    (-) diabetes mellitus  Abdominal                  Anesthesia Plan:   ASA:  3  Plan:   General  Airway:  ETT  Post-op Pain Management: Oral pain medication and IV analgesics  Informed Consent Plan and Risks Discussed With:  Patient and spouse  Use of Blood Products Discussed With:  Patient      I have informed Kanwal Mason and/or legal guardian or family member of the nature of the anesthetic plan, benefits, risks including possible dental damage if relevant, major complications, and any alternative forms of anesthetic management.   All of the patient's questions were answered to the best of my ability. The patient desires the anesthetic management as planned.  Bhupendra Mckinney MD  7/24/2024 7:07 AM  Present on Admission:  **None**

## 2024-07-24 NOTE — DISCHARGE INSTRUCTIONS
Can start Xarelto on 7/29/2024.    Dr. Raines - LUMBAR SPINE SURGERY     Incision:  Ok to get wet in a shower and gently wash with soap and water two days after surgery.  Change your dressing daily.  When there is no drainage for two days in a row, you may discontinue the dressing.  Walk as much as possible after surgery.  No bending, lifting more than 10#, or twisting for 12 weeks.  Wear your back brace when out of bed.   Take vitamin C 1000 mg twice daily with food for 4 weeks after surgery.  Take calcitonin spray once daily for 60 days after surgery.  Take vitamin D 125 mcg once daily for 60 days after surgery.  Take pain medication (Nucynta or Tylenol) as needed for pain, wean down as you are able.  Take methocarbamol for muscle spasms/pain as needed.  Use miralax and colace (over the counter medications) to prevent constipation associated with the narcotic pain meds.  Call Dr. Raines's office with any concerns:  [phone].

## 2024-07-25 LAB
ANION GAP SERPL CALC-SCNC: 10 MMOL/L (ref 0–18)
BUN BLD-MCNC: 16 MG/DL (ref 9–23)
BUN/CREAT SERPL: 20 (ref 10–20)
CALCIUM BLD-MCNC: 9.1 MG/DL (ref 8.7–10.4)
CHLORIDE SERPL-SCNC: 109 MMOL/L (ref 98–112)
CO2 SERPL-SCNC: 23 MMOL/L (ref 21–32)
CREAT BLD-MCNC: 0.8 MG/DL
DEPRECATED RDW RBC AUTO: 50.1 FL (ref 35.1–46.3)
EGFRCR SERPLBLD CKD-EPI 2021: 75 ML/MIN/1.73M2 (ref 60–?)
ERYTHROCYTE [DISTWIDTH] IN BLOOD BY AUTOMATED COUNT: 14.6 % (ref 11–15)
GLUCOSE BLD-MCNC: 109 MG/DL (ref 70–99)
HCT VFR BLD AUTO: 33.5 %
HGB BLD-MCNC: 11 G/DL
MCH RBC QN AUTO: 30.6 PG (ref 26–34)
MCHC RBC AUTO-ENTMCNC: 32.8 G/DL (ref 31–37)
MCV RBC AUTO: 93.1 FL
OSMOLALITY SERPL CALC.SUM OF ELEC: 296 MOSM/KG (ref 275–295)
PLATELET # BLD AUTO: 266 10(3)UL (ref 150–450)
POTASSIUM SERPL-SCNC: 3.9 MMOL/L (ref 3.5–5.1)
RBC # BLD AUTO: 3.6 X10(6)UL
SODIUM SERPL-SCNC: 142 MMOL/L (ref 136–145)
WBC # BLD AUTO: 10.9 X10(3) UL (ref 4–11)

## 2024-07-25 PROCEDURE — 97165 OT EVAL LOW COMPLEX 30 MIN: CPT

## 2024-07-25 PROCEDURE — 85018 HEMOGLOBIN: CPT | Performed by: ORTHOPAEDIC SURGERY

## 2024-07-25 PROCEDURE — 85014 HEMATOCRIT: CPT | Performed by: ORTHOPAEDIC SURGERY

## 2024-07-25 PROCEDURE — 97161 PT EVAL LOW COMPLEX 20 MIN: CPT

## 2024-07-25 PROCEDURE — 85027 COMPLETE CBC AUTOMATED: CPT | Performed by: STUDENT IN AN ORGANIZED HEALTH CARE EDUCATION/TRAINING PROGRAM

## 2024-07-25 PROCEDURE — 97530 THERAPEUTIC ACTIVITIES: CPT

## 2024-07-25 PROCEDURE — 80048 BASIC METABOLIC PNL TOTAL CA: CPT | Performed by: STUDENT IN AN ORGANIZED HEALTH CARE EDUCATION/TRAINING PROGRAM

## 2024-07-25 RX ORDER — ASCORBIC ACID 500 MG
1000 TABLET ORAL 2 TIMES DAILY
Status: DISCONTINUED | OUTPATIENT
Start: 2024-07-25 | End: 2024-07-26

## 2024-07-25 NOTE — PROGRESS NOTES
[facility]  part of [facility]     DM Hospitalist Progress Note     PCP: Natalie Marshall MD    CC: Follow up       Assessment/Plan:   Patient is a 79 year old female with PMH sig for pAF on xarelto, HTN, and HLD who presented to the hospital for lumbar fusion and laminectomy..        Spinal stenosis  Retained HW from L4-L5  - s/p lumbar laminectomy and fusion POD # 1  - prn pain medication, try to minimize pain meds due to fluctuating mental status, patient is alert and oriented but does seem intermittently confused as she keeps saying that the room looks like home.  Otherwise she is able to give me an accurate history on who she saw today and why she is here and her family.Also discussed olympics with me in length   - monitor respiratory status  - encouraged IS use  - prn anti emetics  -Expected acute blood loss anemia postop, postop day 1 hemoglobin 11.0 preop 14.1.  Recheck in a.m.  - PT/SW  - dvt ppx: SCD  - rest of management per ortho     pAF  Secondary hypercoagulable state  - resume xarelto when ok with ortho, likely POD5, check in Am   - atenelol 25 mg daily     HTN  HLD  - simvastatin, atenelol     FN:  - IVF:prn  - Diet: ADAT     DVT Prophy: SCD  Lines: PIV     Dispo: pending clinical course    Questions/concerns were discussed with patient and/or family by bedside.    Note: This chart was prepared using voice recognition software and may contain unintended word substitution errors.       Thank You,  Hollie Webb M.D.  [facility]  Answering Service: [phone]        Subjective     Patient is very pleasant.  Does seem confused about being at home versus the hospital at times.  However then will tell me that she saw Linda this morning which is accurate.  Mental status seems to be fluctuating, likely secondary to meds and continue to monitor.  She is wide-awake and no focal symptoms.  No CP, SOB, or N/V.      Objective     OBJECTIVE:  Temp:  [97.4 °F (36.3 °C)-99.9  °F (37.7 °C)] 99.9 °F (37.7 °C)  Pulse:  [60-65] 63  Resp:  [15-19] 18  BP: ()/(51-76) 102/56  SpO2:  [96 %-100 %] 97 %    Intake/Output:    Intake/Output Summary (Last 24 hours) at 7/25/2024 1431  Last data filed at 7/25/2024 1200  Gross per 24 hour   Intake 1400 ml   Output 850 ml   Net 550 ml       Last 3 Weights   07/24/24 0552 122 lb (55.3 kg)   07/09/24 1818 120 lb (54.4 kg)   12/02/22 2340 128 lb (58.1 kg)   08/02/22 1310 126 lb (57.2 kg)   07/28/22 1339 125 lb (56.7 kg)       Exam  Gen: No acute distress, alert and oriented xself, family but seems intermittently confused   Neck Supple, no JVD  Pulm: Lungs clear, normal respiratory effort, No wheezing or crackles  CV: Heart with regular rate and rhythm, No murmurs, rubs, gallops  Abd: Abdomen soft, nontender, nondistended, no organomegaly, bowel sounds present  MSK:  no clubbing, no cyanosis.  No Lower extremity edema  Skin: no rashes or lesions, well perfused  Psych: mood stable, cooperative  Neuro: no focal deficits    Medications      ascorbic acid  1,000 mg Oral BID    sennosides  17.2 mg Oral Nightly    docusate sodium  100 mg Oral BID    polyethylene glycol (PEG 3350)  17 g Oral Daily    acetaminophen  1,000 mg Oral Q8H NEO    atenolol  25 mg Oral Daily Beta Blocker    atorvastatin  10 mg Oral Nightly      lactated ringers 20 mL/hr at 07/24/24 1957    lactated ringers         sodium chloride    magnesium hydroxide    bisacodyl    fleet enema    ondansetron    metoclopramide    diphenhydrAMINE **OR** diphenhydrAMINE    benzocaine-menthol    methocarbamol    naloxone    ketorolac    Data Review:       Labs:     Recent Labs   Lab 07/25/24 0359   WBC 10.9   HGB 11.0*   MCV 93.1   .0       Recent Labs   Lab 07/25/24  0359      K 3.9      CO2 23.0   BUN 16   CREATSERUM 0.80   CA 9.1   *       No results for input(s): \"ALT\", \"AST\", \"ALB\", \"AMYLASE\", \"LIPASE\", \"LDH\" in the last 168 hours.    Invalid input(s): \"ALPHOS\", \"TBIL\",  \"DBIL\", \"TPROT\"    No results for input(s): \"PGLU\" in the last 168 hours.    No results for input(s): \"TROP\" in the last 168 hours.    Imaging:  XR FLUOROSCOPY C-ARM TIME LESS THAN 1 HOUR (CPT=76000)    Result Date: 7/24/2024  PROCEDURE: XR FLUORO PHYSICIAN TIME< 1 HOUR (CPT=76000)  COMPARISON: [facility], XR FLUOROSCOPY C-ARM  TIME< 1 HOUR (CPT=76000), 8/02/2022, 4:40 PM.  [facility], XR FLUOROSCOPY C-ARM  TIME< 1 HOUR (CPT=76000), 6/02/2020, 10:10 AM.  INDICATIONS: Right side Lumbar 2 - 3 extreme lateral interbody fusion with posterior spinal fusion Lumbar 2 - 3 laminotomy left side, Lumbar 2 - 3, Lumbar 3 - 4 instrumented   FLUORO TIME: 176.8 seconds TOTAL IMAGES: 4 AIR KERMA: 45.84 mGy DAP: 1.10 mGym^2  FINDINGS/CONCLUSION:         Fluoroscopy support was provided.  Images demonstrate lumbar fusion. Please see separate report for additional details.    elm-remote     Dictated by (CST): Andres Layne MD on 7/24/2024 at 3:58 PM     Finalized by (CST): Andres Layne MD on 7/24/2024 at 4:01 PM

## 2024-07-25 NOTE — PROGRESS NOTES
[facility]  Progress Note    Kanwal Mason Patient Status:  Inpatient    3/19/1945 MRN [id number]   Location [facility] 4W/SW/SE Attending Gabriel Raines MD   Hosp Day # 1 PCP Natalie Marshall MD     Subjective:  Kanwal Mason is a(n) 79 year old female.  Denies back or leg pain.  Intrathecal block given in surgery is still working.  Ate a full breakfast.  No nausea.  Very chatty.      Objective/Physical Exam:  General: Alert, orientated x3.  Cooperative.  No apparent distress.    Vital Signs:  Blood pressure 100/51, pulse 63, temperature 98.3 °F (36.8 °C), temperature source Oral, resp. rate 18, height 5' 1\" (1.549 m), weight 122 lb (55.3 kg), SpO2 98%, not currently breastfeeding.    Pain Assessment  Pain Assessment Tool: 0-10  Pasero Sedation Scale: Awake and alert  0-10 Scale: 7 (severe pain)  Pain Location: Back  Pain Orientation: Left;Lower  Pain Descriptors: Aching  Pain Frequency: Constant/continuous    Dressing: clean, dry intact    Extremities:  No lower extremity edema noted.  Calves non-tender bilaterally.  5/5 strength and full sensation to bilateral LE.    Street draining clear urine.    Assessment/Plan:  POD 1 s/p L2-3 right lateral fusion, posterior laminectomy with instrumentation    Mechanical DVT prophylaxis (TOM/ SCDs)  Mobilize patient, PT/OT today, brace when oob  Street out at 1200  Tylenol for pain, toradol for breakthrough pain - avoid narcotics due to side effects of nausea / vomiting  Anticipate dc to home tomorrow pending progress    MELANIE Dunbar  Spine Surgery, [facility]  [phone]  2024  7:43 AM

## 2024-07-25 NOTE — SPIRITUAL CARE NOTE
Spiritual Care Visit Note    Patient Name: Kanwal Mason Date of Spiritual Care Visit: 24   : 3/19/1945 Primary Dx: Lumbar herniated disc       Referred By:      Spiritual Care Taxonomy:    Intended Effects: Demonstrate caring and concern    Methods: Offer support         Visit Type/Summary:    When I attempted visit, patient was not available.  I spoke with RN and she will contact Spiritual Care should there be further need.   remains available.     [facility] [phone]

## 2024-07-25 NOTE — CM/SW NOTE
Sw received MDO for discharge planning. Anticipated therapy need: Home.     Plan: Pending medical clearance, DC to Home/.    SW/GEORGINA to remain available for support and/or discharge planning.     Emma Mcghee MSW, LSW   x [phone]

## 2024-07-25 NOTE — OCCUPATIONAL THERAPY NOTE
OCCUPATIONAL THERAPY EVALUATION - INPATIENT     Room Number: 408/408-A  Evaluation Date: 7/25/2024  Type of Evaluation: Initial  Presenting Problem: L2-3 R lateral fusion w/ posterior lami and instrumentation    Physician Order: IP Consult to Occupational Therapy  Reason for Therapy: ADL/IADL Dysfunction and Discharge Planning    OCCUPATIONAL THERAPY ASSESSMENT   Patient is a 79 year old female admitted 7/24/2024 for L2-3 right lateral fusion, posterior laminectomy with instrumentation. Pt w/ spine precautions and brace when oob  Prior to admission, patient was living w/ her  in a 2 story home. Pt reports being mod I for adls ambulation w/o an ad and driving. Pt reported using a rw at times outside of there home.  Patient is currently functioning below baseline with adls and functional  mobility. Pt currently requiring min a for le dressing and use of rw. Anticipate pt will be able to return home w/ assist of spouse    PLAN  Patient has been evaluated and presents with no skilled Occupational Therapy needs  at this time.  Patient will be discharged from Occupational Therapy services. Please re-order if a new functional limitation presents during this admission.    OT Device Recommendations: None      OCCUPATIONAL THERAPY MEDICAL/SOCIAL HISTORY   Problem List  Principal Problem:    Lumbar herniated disc  Active Problems:    S/P lumbar spinal fusion    HOME SITUATION  Type of Home: House  Home Layout: Two level  Lives With: Spouse  Toilet and Equipment: Toilet riser  Shower/Tub and Equipment: Grab bar; Shower chair; Tub-shower combo  Other Equipment: -- (rw)  Drives: Yes  Patient Regularly Uses: None    Stairs in Home: 12 stairs to bedroom  Use of Assistive Device(s): rw outside of the home    Prior Level of [place]: Prior to admission, patient was living w/ her  in a 2 story home. Pt reports being mod I for adls ambulation w/o an ad and driving. Pt reported using a rw at times outside of there  home    SUBJECTIVE  \"I've done this before\"    OCCUPATIONAL THERAPY EXAMINATION    OBJECTIVE  Precautions: Spine; Lumbar brace (LSO on when OOB)  Fall Risk: Standard fall risk    PAIN ASSESSMENT  Ratin    ACTIVITY TOLERANCE  good    O2 SATURATIONS  Activity on room air    SENSATION  Pt denied numbness/tingling    Behavioral/Emotional/Social: Pt highly distracted and somewhat impulsive requiring frequent vc to redirect attention to task at hand    RANGE OF MOTION   Upper extremity ROM is within functional limits     STRENGTH ASSESSMENT  Upper extremity strength is within functional limits     ACTIVITIES OF DAILY LIVING ASSESSMENT  AM-PAC ‘6-Clicks’ Inpatient Daily Activity Short Form  How much help from another person does the patient currently need…  -   Putting on and taking off regular lower body clothing?: A Little  -   Bathing (including washing, rinsing, drying)?: A Little  -   Toileting, which includes using toilet, bedpan or urinal? : A Little  -   Putting on and taking off regular upper body clothing?: None  -   Taking care of personal grooming such as brushing teeth?: None  -   Eating meals?: None    AM-PAC Score:  Score: 21  Approx Degree of Impairment: 32.79%  Standardized Score (AM-PAC Scale): 44.27  CMS Modifier (G-Code): CJ    FUNCTIONAL ADL ASSESSMENT  Eating: independent  Grooming: setup assist  UB Dressing: setup assist  LB Dressing: min assist  Toileting: na    Skilled Therapy Provided: OT orders received and chart reviewed. RN approving pt pt participation in therapy. Treatment coordinated w/ PT. Pt received in bed, alert and oriented;spouse at bedside. Spine precautions reviewed w/ emphasis on adls and transfers including car. Brace management and wearing schedule reinforced and practiced. Pt verbalizing understanding of information as discussed and reporting that she is familiar w/ techniques from prior spine surgery. Spine packet at beside. On initial contact transferred supine<>sit at eob  w/ vc/cga to log roll. Pt maintained unsupported sitting w/ supervision and occasional cues to avoid posterior lean. Pt donned brace w/ vc and min a. Pt performing sit<>stand transfers bed/chair w/  cga. Pt ambulating in the umaña w/ rw and cga;occasional vc for walker management. Le dressing task initiated w/ pt requiring min a to manage clothing over her feet.     Pt /spouse currently unable to identify potential areas of concern in anticipation of discharge.     At end of session pt remaining up in chair w/ all needs in reach;spouse at bedside. RN aware of pt's status and performance in therapy. No further OT contact planned       EDUCATION PROVIDED  Patient: Role of Occupational Therapy; Plan of Care; Functional Transfer Techniques; Fall Prevention; Compensatory ADL Techniques; Surgical Precautions  Patient's Response to Education: Verbalized Understanding    The patient's Approx Degree of Impairment: 32.79% has been calculated based on documentation in the [facility] '6 clicks' Inpatient Daily Activity Short Form.  Research supports that patients with this level of impairment may benefit from return to home.  Final disposition will be made by interdisciplinary medical team.    Patient End of Session: Up in chair;Needs met;Call light within reach;RN aware of session/findings;All patient questions and concerns addressed;Family present        Patient Evaluation Complexity Level:   Occupational Profile/Medical History LOW - Brief history including review of medical or therapy records    Specific performance deficits impacting engagement in ADL/IADL LOW  1 - 3 performance deficits    Client Assessment/Performance Deficits MODERATE - Comorbidities and min to mod modifications of tasks    Clinical Decision Making LOW - Analysis of occupational profile, problem-focused assessments, limited treatment options    Overall Complexity LOW     Therapeutic Activity: 23 minutes

## 2024-07-25 NOTE — PHYSICAL THERAPY NOTE
PHYSICAL THERAPY EVALUATION - INPATIENT     Room Number: 408/408-A  Evaluation Date: 7/25/2024  Type of Evaluation: Initial   Physician Order: PT Eval and Treat    Presenting Problem: s/p right L2-3 XLIF with posterior spinal fusion, L2-3 laminotomy left side, L2-3, L3-4 instrumented fusion on 7/24     Reason for Therapy: Mobility Dysfunction and Discharge Planning    PHYSICAL THERAPY ASSESSMENT   Patient is a 79 year old female admitted 7/24/2024 for right L2-3 XLIF with posterior spinal fusion, L2-3 laminotomy left side, L2-3, L3-4 instrumented fusion.  Prior to admission, patient's baseline is independent with ADLs and functional mobility with a cane inside, and a RW for long distances outside the home.  Patient is currently functioning near baseline with bed mobility, transfers, gait, stair negotiation, standing prolonged periods, and performing household tasks.  Patient is requiring stand-by assist and contact guard assist as a result of the following impairments: decreased functional strength, pain, impaired dynamic standing balance, cognitive deficits (safety awareness, impulsive), medical status, and limited lumbar ROM.  Physical Therapy will continue to follow for duration of hospitalization.    Patient will benefit from continued skilled PT Services with no additional skilled services but increased support at home.    PLAN  PT Treatment Plan: Bed mobility;Body mechanics;Endurance;Energy conservation;Patient education;Gait training;Strengthening;Stair training;Transfer training;Balance training  Rehab Potential : Good  Frequency (Obs): Daily    PHYSICAL THERAPY MEDICAL/SOCIAL HISTORY     Problem List  Principal Problem:    Lumbar herniated disc  Active Problems:    S/P lumbar spinal fusion      HOME SITUATION  Home Situation  Type of Home: House  Home Layout: Two level  Stairs to Enter : 1  Stairs to Bedroom: 12  Railing: Yes  Lives With: Spouse  Drives: Yes  Patient Owned Equipment: Rolling  walker;Cane  Patient Regularly Uses: None     Prior Level of [place]: independent, ambulates with cane or RW    SUBJECTIVE  Agreeable to activity.     PHYSICAL THERAPY EXAMINATION   OBJECTIVE  Precautions: Spine;Lumbar brace (LSO on when OOB)  Fall Risk: Standard fall risk    WEIGHT BEARING RESTRICTION  Weight Bearing Restriction: None    PAIN ASSESSMENT  Rating: Unable to rate  Location: lower back  Management Techniques: Activity promotion;Body mechanics;Breathing techniques;Repositioning    COGNITION  Overall Cognitive Status:  Impaired safety awareness, impulsive, needs max VC for redirecting to stay on task    RANGE OF MOTION AND STRENGTH ASSESSMENT  Upper extremity ROM and strength are within functional limits.  Lower extremity ROM is within functional limits.  Lower extremity strength is within functional limits.    BALANCE  Static Sitting: Good  Dynamic Sitting: Fair +  Static Standing: Fair  Dynamic Standing: Fair -    AM-PAC '6-Clicks' INPATIENT SHORT FORM - BASIC MOBILITY  How much difficulty does the patient currently have...  Patient Difficulty: Turning over in bed (including adjusting bedclothes, sheets and blankets)?: A Little   Patient Difficulty: Sitting down on and standing up from a chair with arms (e.g., wheelchair, bedside commode, etc.): A Little   Patient Difficulty: Moving from lying on back to sitting on the side of the bed?: A Little   How much help from another person does the patient currently need...   Help from Another: Moving to and from a bed to a chair (including a wheelchair)?: A Little   Help from Another: Need to walk in hospital room?: A Little   Help from Another: Climbing 3-5 steps with a railing?: A Little     AM-PAC Score:  Raw Score: 18   Approx Degree of Impairment: 46.58%   Standardized Score (AM-PAC Scale): 43.63   CMS Modifier (G-Code): CK    FUNCTIONAL ABILITY STATUS  Functional Mobility/Gait Assessment  Gait Assistance: Contact guard assist  Distance (ft): 100 x  2  Assistive Device: Rolling walker  Pattern: Shuffle (slow pace,VC given to correct upright posture and maintain proximity to RW)  Stairs: Stairs  How Many Stairs: 12  Device: 1 Rail  Assist: Contact guard assist  Pattern: Ascend and Descend  Ascend and Descend : Step to  Log rolling: stand-by assist  Sidelying to Sit: stand-by assist  Sit to Stand: contact guard assist    Exercise/Education Provided:  Bed mobility  Body mechanics  Energy conservation  Functional activity tolerated  Gait training  Posture  Transfer training  Spine precautions  Log rolling  Wearing LSO brace when OOB  How to don/doff LSO brace  Benefits of frequent position changes/ambulation  Role of PT and PT plan of care    Skilled Therapy Provided: RN approved PT eval. Pt received resting in bed and agreeable to activity.  at bedside. Introduced self and role, and educated on the above. Pt with c/o minimal pain of lower back. Pt is tangential this date and required constant VC to redirect and stay on task/complete tasks. Completed bed mobility with VC for sequencing and SBA; assisted with donning LSO brace while sitting at EOB; STS transfer to/from EOB and chair with RW and CGA, with VC given for safe body mechanics and hand placement; ambulated in hallway with RW and CGA, overall slow but steady gait. Navigated flight of stairs with 1 HR and VC for sequencing of step-to pattern, although pt intermittently demos reciprocal pattern. No LOB with activity. Returned to room and was left sitting in chair with needs within reach, handoff to RN complete. Anticipate 1 additional session prior to DC home.     The patient's Approx Degree of Impairment: 46.58% has been calculated based on documentation in the [facility] '6 clicks' Inpatient Basic Mobility Short Form.  Research supports that patients with this level of impairment may benefit from home with HH PT however anticipate no needs.  Final disposition will be made by interdisciplinary medical  team.    Patient End of Session: Up in chair;Needs met;RN aware of session/findings;Call light within reach;All patient questions and concerns addressed;Bracing education provided per handout;Family present    CURRENT GOALS  Goals to be met by: 8/1/24  Patient Goal Patient's self-stated goal is: go home   Goal #1 Patient is able to demonstrate supine - sit EOB @ level: supervision     Goal #1   Current Status    Goal #2 Patient is able to demonstrate transfers Sit to/from Stand at assistance level: supervision with walker - rolling     Goal #2  Current Status    Goal #3 Patient is able to ambulate 200 feet with assist device: walker - rolling at assistance level: supervision   Goal #3   Current Status    Goal #4 Patient will negotiate 12 stairs/one curb w/ assistive device and supervision   Goal #4   Current Status    Goal #5 Patient to demonstrate independence with home activity/exercise instructions provided to patient in preparation for discharge.   Goal #5   Current Status    Goal #6    Goal #6  Current Status      Patient Evaluation Complexity Level:  History Low - no personal factors and/or co-morbidities   Examination of body systems Low -  addressing 1-2 elements   Clinical Presentation Low- Stable   Clinical Decision Making  Low Complexity     Therapeutic Activity:  35 minutes

## 2024-07-25 NOTE — OPERATIVE REPORT
[facility]    PATIENT'S NAME: AROLDO YAÑEZ   ATTENDING PHYSICIAN: Hollie Webb MD   OPERATING PHYSICIAN: Gabriel Raines MD   PATIENT ACCOUNT#:   [id number]    LOCATION:  [address] [place] [address]  MEDICAL RECORD #:   [id number]       YOB: 1945  ADMISSION DATE:       07/24/2024      OPERATION DATE:  07/24/2024    OPERATIVE REPORT    PREOPERATIVE DIAGNOSIS:  Spinal stenosis, L2-3, with adjacent segment disc degeneration and retained hardware.  POSTOPERATIVE DIAGNOSIS:  Spinal stenosis, L2-3, with adjacent segment disc degeneration and retained hardware.  PROCEDURE:    1.   Right lateral fusion, L2-3 (96022, 69402) with MiRus interbody cage.    2.   Posterolateral fusion, L2-3 (61175).    3.   Laminectomy, L2-3, with decompression of L2 and L3 nerve roots (66020).  4.   SpineCraft instrumentation, L2 to L5 (82863), with removal of previous hardware.   5.   Intrathecal morphine and fentanyl block for postoperative analgesia.    ASSISTANT:  Linda Palomares PA-C (medically necessary due to high-level complexity of medical procedure).    ANESTHESIA:  General endotracheal.    ESTIMATED BLOOD LOSS:  100 mL.    DRAINS:  None.    COMPLICATIONS:  None.    INDICATIONS:  This is a 79-year-old female with multiple previous lumbar operations.  She has severe spinal stenosis secondary to unilateral collapse and disc herniation of L2-3.  Previous fusion is noted L3 to S1.  I have recommended additional laminectomy and fusion at L2-3 through a minimally invasive approach.  Risks, benefits, and alternatives were explained to the patient in detail.  She appears to understand and has elected to proceed.    OPERATIVE TECHNIQUE:  The patient was given uncomplicated general endotracheal anesthesia and placed in the lateral position with the right side up.  The table was slightly jackknifed, and she was heavily taped down to the table.  All pressure points were well padded.  The right flank was prepped and draped in  the usual fashion for a standard direct lateral approach to L2-3.      Using C-arm, a 1-1/2 inch incision was made over L2-3 in Toney's lines with C-arm as a guide.  This was carried down through multiple layers of the abdominal wall.  We finally came to the lower ribs on the right side.  Dissection was made just superior to what appeared to be the T11 rib going into the retropleural plane.  Then the psoas muscle was noted.  An initial dilator was placed on the L2-3 disc, secured with a K-wire, checked with C-arm and then with triggered EMG's, dilating up to the retractor with triggered EMGs along the way.  Once the retractor was in place, all 4 quadrants checked with triggered EMGs and position checked with C-arm.  The lateral disc at L2-3 was incised with a #15 blade.  The disc space was heavily curetted out, and trials were placed across the disc space.  After curettage down to good endplate bleeding bone, we settled on a MiRus 12 mm 15-degree lordotic titanium interbody cage filled with 1 sponge of Infuse and MagnetOs graft and tamped into place.  Fit was excellent.  We then withdrew the retractor and checked with C-arm.  Standard closure was made.  There was no evidence of pleural leak.  The flank musculature was closed with #1 Vicryl suture in an interrupted fashion, subcutaneous tissue closed with 2-0 PDS, skin closed with 4-0 Monocryl and Dermabond.  A sterile dry dressing was applied.    The patient was then placed prone for the remainder of the procedure on the James table.  The extremities were well padded.  The back was prepped and draped in the usual fashion.      Using C-arm as a guide, a 4-inch incision was made on the left side, carried down to the previous hardware on the left side, and we removed the previous hardware at L4-5.  Previous fusion was noted to be solid.  We then placed Jamshidi needles at L2 and L3 bilaterally, checked with triggered EMGs, and tapped each pedicle with triggered EMGs  at L2 and L3.  K-wires were placed at L2-L3 bilaterally and L4 and L5 through the previous pedicle holes on the left side at L4-5.    We then placed the Panorama retractor on the left side at L2-3, the site of the previous radicular pain.  After the Panorama retractor was placed, soft tissue was cleared off the facet joint using the microscope.  Facet joint was removed with an osteotome.  High-speed drill was then used to thin out the lamina.  Ligamentum flavum was then resected all the way across for full decompression at L2-3.  We took off the entire facet joint on the left side at L2-3, fully decompressing and visualizing the L2 and L3 roots on the left.  Intrathecal morphine and spinal blocks were performed with a 27-gauge Sprotte needle, 0.5 mg morphine and 25 mcg fentanyl for postoperative analgesia.  The dura was closed with Floseal.    We then removed the Panorama retractor and placed it on the right side.  Soft tissue was cleared off the right L2-3 facet joint, which was decorticated and filled with MagnetOs graft for posterolateral fusion at L2-3.    We then placed SpineCraft Langley pedicle screws at L2, L3, L4, L5 on the left side and L3-4 on the right and checked with triggered EMGs and then connected rods.  Compression was placed on the right side at L2-3 to correct the scoliosis.  Final tightening performed on every set screw.  Final x-ray showed excellent placement of the instrumentation and excellent correction of her scoliosis.  Deep fascia irrigated and closed over vancomycin powder 500 mg bilaterally.  Subcutaneous tissue closed with 2-0 PDS.  Skin closed with 4-0 Monocryl and Dermabond.  A sterile dry dressing was applied.  The patient tolerated the procedure well, was extubated, and taken to the recovery room with stable blood pressure and pulse.  There were no complications.  SCDs and TEDs were used throughout the case.  SSEP was unchanged during the entire procedure.    Dictated By Gabriel CORTÉS  MD Yanna  d: 07/25/2024 06:12:38  t: 07/25/2024 08:05:22  [facility] [id number]  [email or web address]/

## 2024-07-26 VITALS
HEIGHT: 61 IN | HEART RATE: 95 BPM | SYSTOLIC BLOOD PRESSURE: 135 MMHG | DIASTOLIC BLOOD PRESSURE: 70 MMHG | TEMPERATURE: 98 F | BODY MASS INDEX: 23.03 KG/M2 | RESPIRATION RATE: 16 BRPM | OXYGEN SATURATION: 97 % | WEIGHT: 122 LBS

## 2024-07-26 LAB
HGB BLD-MCNC: 9.6 G/DL
HGB BLD-MCNC: 9.9 G/DL

## 2024-07-26 PROCEDURE — 97116 GAIT TRAINING THERAPY: CPT

## 2024-07-26 PROCEDURE — 97110 THERAPEUTIC EXERCISES: CPT

## 2024-07-26 PROCEDURE — 85018 HEMOGLOBIN: CPT | Performed by: HOSPITALIST

## 2024-07-26 PROCEDURE — 97530 THERAPEUTIC ACTIVITIES: CPT

## 2024-07-26 RX ORDER — METHOCARBAMOL 500 MG/1
500 TABLET, FILM COATED ORAL 2 TIMES DAILY PRN
Status: SHIPPED | COMMUNITY
Start: 2024-07-26

## 2024-07-26 RX ORDER — CALCITONIN SALMON 200 [IU]/.09ML
1 SPRAY, METERED NASAL DAILY
Qty: 3.7 ML | Refills: 1 | Status: SHIPPED | OUTPATIENT
Start: 2024-07-26

## 2024-07-26 RX ORDER — PSEUDOEPHEDRINE HCL 30 MG
100 TABLET ORAL 2 TIMES DAILY
Qty: 60 CAPSULE | Refills: 0 | Status: SHIPPED | COMMUNITY
Start: 2024-07-26

## 2024-07-26 RX ORDER — RIVAROXABAN 20 MG/1
20 TABLET, FILM COATED ORAL
Qty: 90 TABLET | Refills: 3 | Status: SHIPPED | OUTPATIENT
Start: 2024-07-29

## 2024-07-26 NOTE — PLAN OF CARE
Progress adequate for discharge to home per therapists and MDs. Tylenol given for pain. Ice gel packs PRN comfort. Brace on when out of bed. Discharge instructions given, incision care instructions given, dressing changed by PA; verbalized understanding and agreement.

## 2024-07-26 NOTE — PHYSICAL THERAPY NOTE
PHYSICAL THERAPY TREATMENT NOTE - INPATIENT     Room Number: 408/408-A       Presenting Problem: s/p right L2-3 XLIF with posterior spinal fusion, L2-3 laminotomy left side, L2-3, L3-4 instrumented fusion on 7/24       Problem List  Principal Problem:    Lumbar herniated disc  Active Problems:    S/P lumbar spinal fusion      PHYSICAL THERAPY ASSESSMENT   Patient demonstrates good  progress this session, goals  remain in progress.      Patient is requiring contact guard assist as a result of the following impairments: decreased functional strength, decreased endurance/aerobic capacity, and pain.     Patient continues to function below baseline with bed mobility, transfers, and gait.  Contributing factors to remaining limitations include decreased functional strength, decreased endurance/aerobic capacity, and pain.  Next session anticipate patient to progress bed mobility, transfers, and gait.  Physical Therapy will continue to follow patient for duration of hospitalization.    Patient continues to benefit from continued skilled PT services: at discharge to promote prior level of function.  Anticipate patient will return home with home health PT.    PLAN  PT Treatment Plan: Bed mobility;Body mechanics;Endurance  Frequency (Obs): Daily    SUBJECTIVE  Pt reports being ready for PT RX    OBJECTIVE  Precautions: Lumbar brace;Spine    WEIGHT BEARING RESTRICTION                PAIN ASSESSMENT   Rating: Unable to rate  Location: lower back  Management Techniques: Activity promotion;Body mechanics;Breathing techniques;Repositioning    BALANCE  Static Sitting: Good  Dynamic Sitting: Fair +  Static Standing: Fair  Dynamic Standing: Fair -    ACTIVITY TOLERANCE                          O2 WALK       AM-PAC '6-Clicks' INPATIENT SHORT FORM - BASIC MOBILITY  How much difficulty does the patient currently have...  Patient Difficulty: Turning over in bed (including adjusting bedclothes, sheets and blankets)?: A Little   Patient  Difficulty: Sitting down on and standing up from a chair with arms (e.g., wheelchair, bedside commode, etc.): A Little   Patient Difficulty: Moving from lying on back to sitting on the side of the bed?: A Little   How much help from another person does the patient currently need...   Help from Another: Moving to and from a bed to a chair (including a wheelchair)?: A Little   Help from Another: Need to walk in hospital room?: A Little   Help from Another: Climbing 3-5 steps with a railing?: A Little     AM-PAC Score:  Raw Score: 18   Approx Degree of Impairment: 46.58%   Standardized Score (AM-PAC Scale): 43.63   CMS Modifier (G-Code): CK    FUNCTIONAL ABILITY STATUS  Functional Mobility/Gait Assessment  Gait Assistance: Contact guard assist  Distance (ft): 2 x 100  Assistive Device: Rolling walker  Pattern: Shuffle  Stairs: Stairs  How Many Stairs: 12  Device: 1 Rail  Assist: Contact guard assist  Pattern: Ascend and Descend  Ascend and Descend : Step to  Rolling: minimal assist  Supine to Sit: minimal assist  Sit to Supine: minimal assist  Sit to Stand: minimal assist    Skilled Therapy Provided: Pt seen daily.Min a for bed mobility and transfer.EOB sitting balance activity with emphasis on core stabilization.Pt amb 2 x 100 ft with RW and CGA;provided cuing for gait pattern as well as for postural awareness.Navigated 12 stairs with CGa.There ex.Spinal precautions reviewed;education.    The patient's Approx Degree of Impairment: 46.58% has been calculated based on documentation in the [facility] '6 clicks' Inpatient Daily Activity Short Form.  Research supports that patients with this level of impairment may benefit from Home with Home Health PT.  Final disposition will be made by interdisciplinary medical team.    THERAPEUTIC EXERCISES  Lower Extremity Ankle pumps  Glut sets  Quad sets     Position Supine       Patient End of Session: Up in chair;Call light within reach;RN aware of session/findings;All patient questions  and concerns addressed    CURRENT GOALS   Patient Goal Patient's self-stated goal is: go home   Goal #1 Patient is able to demonstrate supine - sit EOB @ level: supervision     Goal #1   Current Status Min a   Goal #2 Patient is able to demonstrate transfers Sit to/from Stand at assistance level: supervision with walker - rolling     Goal #2  Current Status Min a   Goal #3 Patient is able to ambulate 200 feet with assist device: walker - rolling at assistance level: supervision   Goal #3   Current Status Pt amb 2 x 100 ft with RW and CGA   Goal #4 Patient will negotiate 12 stairs/one curb w/ assistive device and supervision   Goal #4   Current Status Navigated 12 stairs with CGA   Goal #5 Patient to demonstrate independence with home activity/exercise instructions provided to patient in preparation for discharge.   Goal #5   Current Status In progress   Goal #6    Goal #6  Current Status      Gait Training: 15 minutes  Therapeutic Activity: 15 minutes  Neuromuscular Re-education:  minutes  Therapeutic Exercise: 15 minutes  Canalith Repositioning:  minutes  Manual Therapy:  minutes  Can add/delete any of these

## 2024-07-26 NOTE — DISCHARGE SUMMARY
[facility]  part of [facility] Internal Medicine Discharge Summary   Patient ID:  Kanwal Mason  [id number]  79 year old  3/19/1945    Admit date: 7/24/2024    Discharge date and time: 7/26/24    Attending Physician: Hollie Webb MD     Primary Care Physician: Natalie Marshall MD     Reason for admission lumbar fusion and laminectomy (see HPI on HP for further detail)    Discharged Condition: stable    Disposition: home    Risk of Readmission Lace+ Score: 21  59-90 High Risk  29-58 Medium Risk  0-28   Low Risk    Important follow up:  -Follow-up per surgery, scheduled surgery, no MA appointment made for PCP  Discharge meds  I reviewed and reconciled current and discharge medications on the day of discharge with the changes reflected below.       Medication List        START taking these medications      docusate sodium 100 MG Caps  Commonly known as: COLACE  Take 100 mg by mouth 2 (two) times daily.            CHANGE how you take these medications      Xarelto 20 MG Tabs  Generic drug: rivaroxaban  Take 1 tablet (20 mg total) by mouth daily with food.  Start taking on: July 29, 2024  What changed: These instructions start on July 29, 2024. If you are unsure what to do until then, ask your doctor or other care provider.            CONTINUE taking these medications      acetaminophen 500 MG Tabs  Commonly known as: Tylenol Extra Strength     atenolol 25 MG Tabs  Commonly known as: Tenormin  Take 1 tablet (25 mg total) by mouth once daily.     Multivital Chew     simvastatin 20 MG Tabs  Commonly known as: Zocor  Take 1 tablet (20 mg total) by mouth nightly.     vitamin C 1000 MG Tabs  Take 1 tablet (1,000 mg total) by mouth 2 (two) times daily.               Where to Get Your Medications        You can get these medications from any pharmacy    Bring a paper prescription for each of these medications  Xarelto 20 MG Tabs       Information about where to get these medications is not  yet available    Ask your nurse or doctor about these medications  docusate sodium 100 MG Caps       HPI per chart    History of Present Illness: Patient is a 79 year old female with PMH sig for pAF on xarelto, HTN, and HLD who presented to the hospital for lumbar fusion and laminectomy. Patient was seen and examined post operatively in PACU.She was still very out of it from anesthesia and unable to participate in exam. Vitals stable. Per nurse at bedside, no intra-op complications.   Hospital Course  Patient did well postop.  Hemoglobin did drop to 9 point 6 repeat 9.9.  Plan to start Xarelto on 7/29/2024 per discussion with orthospine.  Cleared by PT for home.  Discharge Diagnoses:   Spinal stenosis  Retained HW from L4-L5  - s/p lumbar laminectomy and fusion POD # 1  - prn pain medication, try to minimize pain meds due to fluctuating mental status, patient is alert and oriented but does seem intermittently confused as she keeps saying that the room looks like home.  Otherwise she is able to give me an accurate history on who she saw today and why she is here and her family.Also discussed olympics with me in length   - monitor respiratory status  - encouraged IS use  - prn anti emetics  -Expected acute blood loss anemia postop, postop day 1 hemoglobin 11.0 preop 14.1.  repeat 9.6->9.9, stable for dc  - PT/SW  - dvt ppx: SCD  - rest of management per ortho     pAF  Secondary hypercoagulable state  - resume xarelto when ok with ortho,  7/29/24  - atenelol 25 mg daily     HTN  HLD  - simvastatin, atenelol    Consults: IP CONSULT TO HOSPITALIST  IP CONSULT TO CASE MANAGEMENT  IP CONSULT TO RESPIRATORY CARE  IP CONSULT TO SPIRITUAL CARE  IP CONSULT TO SOCIAL WORK    Radiology: XR FLUOROSCOPY C-ARM TIME LESS THAN 1 HOUR (CPT=76000)    Result Date: 7/24/2024  PROCEDURE: XR FLUORO PHYSICIAN TIME< 1 HOUR (CPT=76000)  COMPARISON: [facility], XR FLUOROSCOPY C-ARM  TIME< 1 HOUR (CPT=76000), 8/02/2022, 4:40 PM.   [facility], XR FLUOROSCOPY C-ARM  TIME< 1 HOUR (CPT=76000), 6/02/2020, 10:10 AM.  INDICATIONS: Right side Lumbar 2 - 3 extreme lateral interbody fusion with posterior spinal fusion Lumbar 2 - 3 laminotomy left side, Lumbar 2 - 3, Lumbar 3 - 4 instrumented   FLUORO TIME: 176.8 seconds TOTAL IMAGES: 4 AIR KERMA: 45.84 mGy DAP: 1.10 mGym^2  FINDINGS/CONCLUSION:         Fluoroscopy support was provided.  Images demonstrate lumbar fusion. Please see separate report for additional details.    [email or web address]     Dictated by (CST): Andres Layne MD on 7/24/2024 at 3:58 PM     Finalized by (CST): Andres Layne MD on 7/24/2024 at 4:01 PM             Operative Procedures: Procedure(s) (LRB):  Right side Lumbar 2 - 3 extreme lateral interbody fusion with posterior spinal fusion Lumbar 2 - 3 laminotomy left side, Lumbar 2 - 3, Lumbar 3 - 4 instrumented fusion (Bilateral)  POSTERIOR LUMBAR LAMINECT SPINAL FUS W/INSTR 2 LEV (Left)     Day of discharge pain controlled, patient is a little forgetful but she is alert and oriented x 3, knew her surgery, able to discuss current events without issue.    Exam  Vitals:    07/26/24 0819   BP: 135/70   Pulse:    Resp: 16   Temp: 97.9 °F (36.6 °C)     No acute distress, alert and orientedx3  Lungs Clear  Heart Regular  Abdomen Benign    Total Time Coordinating Care: > than 30 minutes  Note: This chart was prepared using voice recognition software and may contain unintended word substitution errors.     Patient had opportunity to ask questions and state understand and agree with therapeutic plan as outlined

## 2024-07-26 NOTE — PLAN OF CARE
From home with spouse. Forgetfulness noted. Up with therapy into umaña with walker. General diet, assisted to order.       Problem: Patient Centered Care  Goal: Patient preferences are identified and integrated in the patient's plan of care  Description: Interventions:  - What would you like us to know as we care for you? From home with spouse  - Provide timely, complete, and accurate information to patient/family  - Incorporate patient and family knowledge, values, beliefs, and cultural backgrounds into the planning and delivery of care  - Encourage patient/family to participate in care and decision-making at the level they choose  - Honor patient and family perspectives and choices  Outcome: Progressing

## 2024-07-26 NOTE — PROGRESS NOTES
[facility]  Progress Note    Kanwal Mason Patient Status:  Inpatient    3/19/1945 MRN [id number]   Location [facility] 4W/SW/SE Attending Hollie Webb MD   Hosp Day # 2 PCP Natalie Marshall MD     Subjective:  Kanwal Mason is a(n) 79 year old female.  C/o LB discomfort, denies leg pain.  Did well in PT.  Ready to go home.    Objective/Physical Exam:  General: Alert, orientated x3.  Cooperative.  No apparent distress.    Vital Signs:  Blood pressure 135/70, pulse 95, temperature 97.9 °F (36.6 °C), temperature source Oral, resp. rate 16, height 5' 1\" (1.549 m), weight 122 lb (55.3 kg), SpO2 97%, not currently breastfeeding.    Pain Assessment  Pain Assessment Tool: 0-10  Pasero Sedation Scale: Awake and alert  0-10 Scale: \"Moderate\"  Pain Location: Back  Pain Orientation: Left;Lower  Pain Descriptors: Aching  Pain Frequency: Constant/continuous  Pain Intervention(s): Medication;Declined    Incisions:  without drainage, +ecchymosis to right flank below incision    Extremities:  No lower extremity edema noted.  Calves non-tender bilaterally.  5/5 strength and intact sensation to bilateral LE    Hgb: 9.9    Assessment/Plan:  POD 2 s/p L2-3 right lateral fusion, posterior laminectomy with instrumentation    Ok to discharge to home.  Ok to shower without dressings tomorrow.  Continue tylenol for pain.  Follow up in office on 24.    MELANIE Dunbar  Spine Surgery, [facility]  [phone]  2024  3:51 PM

## 2024-07-26 NOTE — CM/SW NOTE
Anticipated therapy need: Home with Home Healthcare    CM sent HH referrals via Aidin.  Orders and face to face entered.    CM will meet with patient at bedside to provide list/confirm choice HHA prior to discharge.    1034 CM met with patient at bedside to discuss dc planning.  Patient is refusing HHC arrangement at this time - she denies needing additional support at home.      / to remain available for support and/or discharge planning.     Plan: Home with  pending medical clearance (declining HHC arrangement)    Yolanda Faria RN, BSN  Nurse   [phone]

## 2024-07-27 NOTE — PAYOR COMM NOTE
--------------  DISCHARGE REVIEW    Payor: [employer] [place] [employer]  Subscriber #:  [id number]  Authorization Number: [id number]    Admit date: 7/24/24  Admit time:   5:40 AM  Discharge Date: 7/26/2024  5:42 PM         [facility]  part of [facility] Internal Medicine Discharge Summary   Patient ID:  Kanwal Mason  [id number]  79 year old  3/19/1945    Admit date: 7/24/2024    Discharge date and time: 7/26/24    Attending Physician: Hollie Webb MD     Primary Care Physician: Natalie Marshall MD     Reason for admission lumbar fusion and laminectomy (see HPI on HP for further detail)    Discharged Condition: stable    Disposition: home    Risk of Readmission Lace+ Score: 21  59-90 High Risk  29-58 Medium Risk  0-28   Low Risk    Important follow up:  -Follow-up per surgery, scheduled surgery, no MA appointment made for PCP  Discharge meds  I reviewed and reconciled current and discharge medications on the day of discharge with the changes reflected below.       Medication List        START taking these medications      docusate sodium 100 MG Caps  Commonly known as: COLACE  Take 100 mg by mouth 2 (two) times daily.            CHANGE how you take these medications      Xarelto 20 MG Tabs  Generic drug: rivaroxaban  Take 1 tablet (20 mg total) by mouth daily with food.  Start taking on: July 29, 2024  What changed: These instructions start on July 29, 2024. If you are unsure what to do until then, ask your doctor or other care provider.            CONTINUE taking these medications      acetaminophen 500 MG Tabs  Commonly known as: Tylenol Extra Strength     atenolol 25 MG Tabs  Commonly known as: Tenormin  Take 1 tablet (25 mg total) by mouth once daily.     Multivital Chew     simvastatin 20 MG Tabs  Commonly known as: Zocor  Take 1 tablet (20 mg total) by mouth nightly.     vitamin C 1000 MG Tabs  Take 1 tablet (1,000 mg total) by mouth 2 (two) times daily.               Where to Get Your  Medications        You can get these medications from any pharmacy    Bring a paper prescription for each of these medications  Xarelto 20 MG Tabs       Information about where to get these medications is not yet available    Ask your nurse or doctor about these medications  docusate sodium 100 MG Caps       HPI per chart    History of Present Illness: Patient is a 79 year old female with PMH sig for pAF on xarelto, HTN, and HLD who presented to the hospital for lumbar fusion and laminectomy. Patient was seen and examined post operatively in PACU.She was still very out of it from anesthesia and unable to participate in exam. Vitals stable. Per nurse at bedside, no intra-op complications.   Hospital Course  Patient did well postop.  Hemoglobin did drop to 9 point 6 repeat 9.9.  Plan to start Xarelto on 7/29/2024 per discussion with orthospine.  Cleared by PT for home.  Discharge Diagnoses:   Spinal stenosis  Retained HW from L4-L5  - s/p lumbar laminectomy and fusion POD # 1  - prn pain medication, try to minimize pain meds due to fluctuating mental status, patient is alert and oriented but does seem intermittently confused as she keeps saying that the room looks like home.  Otherwise she is able to give me an accurate history on who she saw today and why she is here and her family.Also discussed olympics with me in length   - monitor respiratory status  - encouraged IS use  - prn anti emetics  -Expected acute blood loss anemia postop, postop day 1 hemoglobin 11.0 preop 14.1.  repeat 9.6->9.9, stable for dc  - PT/SW  - dvt ppx: SCD  - rest of management per ortho     pAF  Secondary hypercoagulable state  - resume xarelto when ok with ortho,  7/29/24  - atenelol 25 mg daily     HTN  HLD  - simvastatin, atenelol    Consults: IP CONSULT TO HOSPITALIST  IP CONSULT TO CASE MANAGEMENT  IP CONSULT TO RESPIRATORY CARE  IP CONSULT TO SPIRITUAL CARE  IP CONSULT TO SOCIAL WORK    Radiology: XR FLUOROSCOPY C-ARM TIME LESS THAN 1  HOUR (CPT=76000)    Result Date: 7/24/2024  PROCEDURE: XR FLUORO PHYSICIAN TIME< 1 HOUR (CPT=76000)  COMPARISON: [facility], XR FLUOROSCOPY C-ARM  TIME< 1 HOUR (CPT=76000), 8/02/2022, 4:40 PM.  [facility], XR FLUOROSCOPY C-ARM  TIME< 1 HOUR (CPT=76000), 6/02/2020, 10:10 AM.  INDICATIONS: Right side Lumbar 2 - 3 extreme lateral interbody fusion with posterior spinal fusion Lumbar 2 - 3 laminotomy left side, Lumbar 2 - 3, Lumbar 3 - 4 instrumented   FLUORO TIME: 176.8 seconds TOTAL IMAGES: 4 AIR KERMA: 45.84 mGy DAP: 1.10 mGym^2  FINDINGS/CONCLUSION:         Fluoroscopy support was provided.  Images demonstrate lumbar fusion. Please see separate report for additional details.    [email or web address]     Dictated by (CST): Andres Layne MD on 7/24/2024 at 3:58 PM     Finalized by (CST): Andres Layne MD on 7/24/2024 at 4:01 PM             Operative Procedures: Procedure(s) (LRB):  Right side Lumbar 2 - 3 extreme lateral interbody fusion with posterior spinal fusion Lumbar 2 - 3 laminotomy left side, Lumbar 2 - 3, Lumbar 3 - 4 instrumented fusion (Bilateral)  POSTERIOR LUMBAR LAMINECT SPINAL FUS W/INSTR 2 LEV (Left)     Day of discharge pain controlled, patient is a little forgetful but she is alert and oriented x 3, knew her surgery, able to discuss current events without issue.    Exam  Vitals:    07/26/24 0819   BP: 135/70   Pulse:    Resp: 16   Temp: 97.9 °F (36.6 °C)     No acute distress, alert and orientedx3  Lungs Clear  Heart Regular  Abdomen Benign    Total Time Coordinating Care: > than 30 minutes  Note: This chart was prepared using voice recognition software and may contain unintended word substitution errors.     Patient had opportunity to ask questions and state understand and agree with therapeutic plan as outlined      Electronically signed by Hollie Webb MD on 7/26/2024  3:16 PM         REVIEWER COMMENTS

## 2024-08-01 NOTE — ANESTHESIA POSTPROCEDURE EVALUATION
Patient: Kanwal Mason    Procedure Summary       Date: 07/24/24 Room / Location: [facility] MAIN OR [address] [place] [address] MAIN OR    Anesthesia Start: 0755 Anesthesia Stop: 1303    Procedures:       Right side Lumbar 2 - 3 extreme lateral interbody fusion with posterior spinal fusion Lumbar 2 - 3 laminotomy left side, Lumbar 2 - 3, Lumbar 3 - 4 instrumented fusion (Bilateral: Spine Lumbar)      POSTERIOR LUMBAR LAMINECT SPINAL FUS W/INSTR 2 LEV (Left: Spine Lumbar) Diagnosis: (Painful hardware, stenosis, disc herniation lumbar spine)    Surgeons: Gabriel Raines MD Anesthesiologist: Bhupendra Mckinney MD    Anesthesia Type: general ASA Status: 3            Anesthesia Type: general    Vitals Value Taken Time   /70 07/26/24 0819   Temp 97.9 °F (36.6 °C) 07/26/24 0819   Pulse 95 07/26/24 1540   Resp 16 07/26/24 0819   SpO2 97 % 07/26/24 0819       [facility] AN Post Evaluation:   Patient Evaluated in PACU  Patient Participation: complete - patient participated  Level of Consciousness: awake and alert  Pain Management: adequate  Airway Patency:patent  Dental exam unchanged from preop  Yes    Nausea/Vomiting: none  Cardiovascular Status: hemodynamically stable  Respiratory Status: room air  Postoperative Hydration euvolemic      Bhupendra Mckinney MD

## 2025-03-28 ENCOUNTER — ANESTHESIA (OUTPATIENT)
Dept: SURGERY | Facility: HOSPITAL | Age: 80
End: 2025-03-28
Payer: MEDICARE

## 2025-03-28 ENCOUNTER — HOSPITAL ENCOUNTER (OUTPATIENT)
Facility: HOSPITAL | Age: 80
Discharge: HOME | End: 2025-03-29
Attending: SURGERY | Admitting: SURGERY
Payer: MEDICARE

## 2025-03-28 ENCOUNTER — ANESTHESIA EVENT (OUTPATIENT)
Dept: SURGERY | Facility: HOSPITAL | Age: 80
End: 2025-03-28
Payer: MEDICARE

## 2025-03-28 PROBLEM — C50.912 CANCER OF LEFT BREAST, STAGE 1 (HCC): Status: ACTIVE | Noted: 2025-03-28

## 2025-03-28 PROCEDURE — 88309 TISSUE EXAM BY PATHOLOGIST: CPT | Performed by: SURGERY

## 2025-03-28 PROCEDURE — 88307 TISSUE EXAM BY PATHOLOGIST: CPT | Performed by: SURGERY

## 2025-03-28 PROCEDURE — 0HTU0ZZ RESECTION OF LEFT BREAST, OPEN APPROACH: ICD-10-PCS | Performed by: SURGERY

## 2025-03-28 RX ORDER — ATENOLOL 25 MG/1
25 TABLET ORAL EVERY MORNING
Status: DISCONTINUED | OUTPATIENT
Start: 2025-03-29 | End: 2025-03-29

## 2025-03-28 RX ORDER — ACETAMINOPHEN 500 MG
1000 TABLET ORAL EVERY 8 HOURS SCHEDULED
Status: DISCONTINUED | OUTPATIENT
Start: 2025-03-28 | End: 2025-03-29

## 2025-03-28 RX ORDER — ONDANSETRON 2 MG/ML
4 INJECTION INTRAMUSCULAR; INTRAVENOUS EVERY 4 HOURS PRN
Status: DISCONTINUED | OUTPATIENT
Start: 2025-03-28 | End: 2025-03-29

## 2025-03-28 RX ORDER — ATORVASTATIN CALCIUM 10 MG/1
10 TABLET, FILM COATED ORAL NIGHTLY
Status: DISCONTINUED | OUTPATIENT
Start: 2025-03-28 | End: 2025-03-29

## 2025-03-28 RX ORDER — DEXAMETHASONE SODIUM PHOSPHATE 4 MG/ML
VIAL (ML) INJECTION AS NEEDED
Status: DISCONTINUED | OUTPATIENT
Start: 2025-03-28 | End: 2025-03-28 | Stop reason: SURG

## 2025-03-28 RX ORDER — SODIUM CHLORIDE, SODIUM LACTATE, POTASSIUM CHLORIDE, CALCIUM CHLORIDE 600; 310; 30; 20 MG/100ML; MG/100ML; MG/100ML; MG/100ML
INJECTION, SOLUTION INTRAVENOUS CONTINUOUS
Status: DISCONTINUED | OUTPATIENT
Start: 2025-03-28 | End: 2025-03-28 | Stop reason: HOSPADM

## 2025-03-28 RX ORDER — NALOXONE HYDROCHLORIDE 0.4 MG/ML
0.08 INJECTION, SOLUTION INTRAMUSCULAR; INTRAVENOUS; SUBCUTANEOUS AS NEEDED
Status: DISCONTINUED | OUTPATIENT
Start: 2025-03-28 | End: 2025-03-28 | Stop reason: HOSPADM

## 2025-03-28 RX ORDER — ROCURONIUM BROMIDE 10 MG/ML
INJECTION, SOLUTION INTRAVENOUS AS NEEDED
Status: DISCONTINUED | OUTPATIENT
Start: 2025-03-28 | End: 2025-03-28 | Stop reason: SURG

## 2025-03-28 RX ORDER — METOPROLOL TARTRATE 25 MG/1
25 TABLET, FILM COATED ORAL ONCE AS NEEDED
Status: DISCONTINUED | OUTPATIENT
Start: 2025-03-28 | End: 2025-03-28 | Stop reason: HOSPADM

## 2025-03-28 RX ORDER — ONDANSETRON 2 MG/ML
INJECTION INTRAMUSCULAR; INTRAVENOUS AS NEEDED
Status: DISCONTINUED | OUTPATIENT
Start: 2025-03-28 | End: 2025-03-28 | Stop reason: SURG

## 2025-03-28 RX ORDER — ACETAMINOPHEN 500 MG
1000 TABLET ORAL ONCE
Status: COMPLETED | OUTPATIENT
Start: 2025-03-28 | End: 2025-03-28

## 2025-03-28 RX ORDER — MIDAZOLAM HYDROCHLORIDE 1 MG/ML
INJECTION INTRAMUSCULAR; INTRAVENOUS AS NEEDED
Status: DISCONTINUED | OUTPATIENT
Start: 2025-03-28 | End: 2025-03-28 | Stop reason: SURG

## 2025-03-28 RX ORDER — HYDRALAZINE HYDROCHLORIDE 20 MG/ML
INJECTION INTRAMUSCULAR; INTRAVENOUS AS NEEDED
Status: DISCONTINUED | OUTPATIENT
Start: 2025-03-28 | End: 2025-03-28 | Stop reason: SURG

## 2025-03-28 RX ORDER — LABETALOL HYDROCHLORIDE 5 MG/ML
5 INJECTION, SOLUTION INTRAVENOUS EVERY 5 MIN PRN
Status: DISCONTINUED | OUTPATIENT
Start: 2025-03-28 | End: 2025-03-28 | Stop reason: HOSPADM

## 2025-03-28 RX ORDER — GABAPENTIN 300 MG/1
300 CAPSULE ORAL 3 TIMES DAILY
Status: DISCONTINUED | OUTPATIENT
Start: 2025-03-28 | End: 2025-03-29

## 2025-03-28 RX ORDER — LIDOCAINE HYDROCHLORIDE 10 MG/ML
INJECTION, SOLUTION EPIDURAL; INFILTRATION; INTRACAUDAL; PERINEURAL AS NEEDED
Status: DISCONTINUED | OUTPATIENT
Start: 2025-03-28 | End: 2025-03-28 | Stop reason: SURG

## 2025-03-28 RX ORDER — SODIUM CHLORIDE, SODIUM LACTATE, POTASSIUM CHLORIDE, CALCIUM CHLORIDE 600; 310; 30; 20 MG/100ML; MG/100ML; MG/100ML; MG/100ML
INJECTION, SOLUTION INTRAVENOUS CONTINUOUS
Status: DISCONTINUED | OUTPATIENT
Start: 2025-03-28 | End: 2025-03-29

## 2025-03-28 RX ORDER — BUPIVACAINE HYDROCHLORIDE 2.5 MG/ML
INJECTION, SOLUTION EPIDURAL; INFILTRATION; INTRACAUDAL; PERINEURAL AS NEEDED
Status: DISCONTINUED | OUTPATIENT
Start: 2025-03-28 | End: 2025-03-28 | Stop reason: HOSPADM

## 2025-03-28 RX ADMIN — DEXAMETHASONE SODIUM PHOSPHATE 8 MG: 4 MG/ML VIAL (ML) INJECTION at 08:04:00

## 2025-03-28 RX ADMIN — ONDANSETRON 4 MG: 2 INJECTION INTRAMUSCULAR; INTRAVENOUS at 07:50:00

## 2025-03-28 RX ADMIN — ROCURONIUM BROMIDE 10 MG: 10 INJECTION, SOLUTION INTRAVENOUS at 08:20:00

## 2025-03-28 RX ADMIN — HYDRALAZINE HYDROCHLORIDE 10 MG: 20 INJECTION INTRAMUSCULAR; INTRAVENOUS at 08:39:00

## 2025-03-28 RX ADMIN — LIDOCAINE HYDROCHLORIDE 50 MG: 10 INJECTION, SOLUTION EPIDURAL; INFILTRATION; INTRACAUDAL; PERINEURAL at 07:46:00

## 2025-03-28 RX ADMIN — ROCURONIUM BROMIDE 10 MG: 10 INJECTION, SOLUTION INTRAVENOUS at 08:39:00

## 2025-03-28 RX ADMIN — ROCURONIUM BROMIDE 30 MG: 10 INJECTION, SOLUTION INTRAVENOUS at 08:02:00

## 2025-03-28 RX ADMIN — MIDAZOLAM HYDROCHLORIDE 2 MG: 1 INJECTION INTRAMUSCULAR; INTRAVENOUS at 07:42:00

## 2025-03-28 NOTE — OPERATIVE REPORT
[facility]  part of [facility]         Patient Name: Kanwal Mason   MRN: [id number]     Date of Operation:  3/28/2025   Site:  [facility] (part of [facility])       : 3/19/1945       PREOPERATIVE DIAGNOSES: Left breast cancer, T2 NX involving the nipple areolar complex, 2 additional indeterminate left breast lesions  POSTOPERATIVE DIAGNOSES:  Same     PROCEDURE PERFORMED:  Left total mastectomy     SURGEON: Festus Lambert M.D.      ASSISTANT: Rubén Chaudhary CSA  (skilled services required for positioning, prepping, draping, setting up the field, exposing, retracting, suturing, closing, dressing, etc.)     ANESTHESIA: General     COMPLICATIONS: None     ESTIMATED BLOOD LOSS: 25 mL     SPECIMEN: Left left breast     IMPLANTS: None     GRAFTS: None      DRAINS: 15 Thai round drain     BLOOD PRODUCTS ADMINISTERED: None     HISTORY: This patient is a 80 year old-year-old female who self detected a left nipple areola lesion.  Imaging identified a suspicious mass, not amenable to percutaneous core needle biopsy due to proximity to the skin.  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  MRI demonstrated residual 2.3 cm of suspicious tissue, and 2 additional indeterminate lesions in the left breast.  Given the involvement of the areola, the residual disease, and 3 additional indeterminate areas in the left breast, we opt for total mastectomy.  [place] lymph node assessment is deferred due to T2 N0 clinical staging in her demographic.  She was seen by Dr. Benites in hematology oncologic consultation.  A lung lesion was biopsied to rule out metastatic disease, which was benign.  The risks, benefits, and alternatives of surgery were discussed with her and her , Marko.  She held the Xarelto for 2 days.     FINDINGS: No suspicious tissue in the area of dissection.      PROCEDURE: The patient was seen in the preoperative holding area with her , Marko. The surgical plan was reviewed.  The left breast was initialed. She was taken to the operating room, placed in the supine position, and administered general anesthesia.  The left upper extremity was positioned on an armboard.  The field the area was prepped and draped.  A timeout was performed.  A nipple areola sacrificing incision was marked, using care to go widely around the areola where the prior biopsy and incision was.  Quarter percent bupivacaine without epinephrine was used to anesthetize the incision site.  Flaps were raised superiorly, medially, and inferiorly to the borders of the breast.  The breast was removed en bloc with the pectoral fascia from the chest wall incorporating the entire axillary tail of the breast.  The intercostal brachial nerve was spared.  The specimen was oriented with a marking stitch on the lateral end of the skin ellipse and forwarded to pathology labeled \"left breast\".  At no point during the dissection was any suspicious tissue encountered beneath the flap or in the chest wall resection.  The wound was irrigated with water and rendered hemostatic.  The skin was contoured medially and laterally to eliminate dogears.  A 15 Swiss round drain was introduced through the inferior lateral skin flap and positioned through the axillary area and beneath the upper flap.  There was secured with a silk stitch.  The wound was closed with INSORB staples, 4-0 Vicryl subcuticular suture, and dressed with Dermabond and 1 inch Steri-Strips.  The drain was dressed with a Biopatch and Tegaderm and placed to bulb suction.  The wound was airtight.  Fluffs and an Ace wrap were applied.  The needle, sponge, and instrument counts were reported as correct.  The patient was awakened and transported to recovery.  Her  was notified of the findings and her status.        Festus Lambert MD

## 2025-03-28 NOTE — PLAN OF CARE
Pt recived from PACU. Hard to touch swelling noted above incision. Ace wrap redressed to cover swelling. Gabapentin and tylenol given to manage pain as patient has an allergy to narcotics. She is tolerating a general diet and voiding freely.    Problem: Patient Centered Care  Goal: Patient preferences are identified and integrated in the patient's plan of care  Description: Interventions:- What would you like us to know as we care for you? - Provide timely, complete, and accurate information to patient/family- Incorporate patient and family knowledge, values, beliefs, and cultural backgrounds into the planning and delivery of care- Encourage patient/family to participate in care and decision-making at the level they choose- Honor patient and family perspectives and choices  Outcome: Progressing     Problem: PAIN - ADULT  Goal: Verbalizes/displays adequate comfort level or patient's stated pain goal  Description: INTERVENTIONS:- Encourage pt to monitor pain and request assistance- Assess pain using appropriate pain scale- Administer analgesics based on type and severity of pain and evaluate response- Implement non-pharmacological measures as appropriate and evaluate response- Consider cultural and social influences on pain and pain management- Manage/alleviate anxiety- Utilize distraction and/or relaxation techniques- Monitor for opioid side effects- Notify MD/LIP if interventions unsuccessful or patient reports new pain- Anticipate increased pain with activity and pre-medicate as appropriate  Outcome: Progressing     Problem: SAFETY ADULT - FALL  Goal: Free from fall injury  Description: INTERVENTIONS:- Assess pt frequently for physical needs- Identify cognitive and physical deficits and behaviors that affect risk of falls.- [place] fall precautions as indicated by assessment.- Educate pt/family on patient safety including physical limitations- Instruct pt to call for assistance with activity based on assessment-  Modify environment to reduce risk of injury- Provide assistive devices as appropriate- Consider OT/PT consult to assist with strengthening/mobility- Encourage toileting schedule  Outcome: Progressing

## 2025-03-28 NOTE — INTERVAL H&P NOTE
Pre-op Diagnosis: Malignant neoplasm of areola of left breast in female, estrogen receptor positive    The above referenced H&P was reviewed by Festus Schreiber MD on 3/28/2025, the patient was examined and no significant changes have occurred in the patient's condition since the H&P was performed.  I discussed with the patient and/or legal representative the potential benefits, risks and side effects of this procedure; the likelihood of the patient achieving goals; and potential problems that might occur during recuperation.  I discussed reasonable alternatives to the procedure, including risks, benefits and side effects related to the alternatives and risks related to not receiving this procedure.  We will proceed with procedure as planned.    She underwent a percutaneous lung biopsy for lung lesion, which was benign.  She was cleared for surgery by oncology.  She has held the Xarelto for 2 days.  We plan left total mastectomy.  We opt to forego sentinel lymph node sampling due to T2, ER positive, HER2 negative disease in 80-year-old patient.  We discussed Exparel as an adjunct for postoperative pain control.  Anticipate overnight stay.  The left breast was initialed.  The surgical plan was reviewed in detail with her and her , Marko.

## 2025-03-28 NOTE — H&P
Referring Physician: Dr. Marshall    This 79-year-old female returns with her , Marko, to discuss the new diagnosis of left breast cancer made at lumpectomy surgery.    HISTORY OF PRESENT ILLNESS: She presented with a mass of the left areola. Imaging was suspicious for malignancy, but image guided biopsy was not possible due to the superficial nature of it. She underwent a left lumpectomy incorporating a portion of the left areola. She was diagnosed with invasive ductal carcinoma (see pathology below). The margin was positive. She underwent an MRI which demonstrates 2.3 cm of residual enhancement suspicious for persistent cancer of the nipple and areola, and 2 additional indeterminant areas in the left breast.  They met with Dr. Benites yesterday. He was favoring mastectomy surgery. A CT of the chest was ordered for the right lung nodule seen on chest x-ray.  They met Dr. Cordova yesterday to discuss hematuria, and a CT was ordered.    Breast cancer risk factors: Family history: Paternal grandmother. See prior archive.    Accompanied by her  Marko. She was wearing a mask, then took it off.    Past Medical History:   Diagnosis Date   Arrhythmia   Cataract   Cervical spinal stenosis   C5-6, C6-7   Chronic right-sided low back pain without sciatica 11/12/2018   COVID 2023   Diverticulosis 06/07/2005   DJD (degenerative joint disease), cervical   Esophageal reflux   dilation   High blood pressure   High cholesterol   Lumbar post-laminectomy syndrome 11/06/2017   Seeing , getting kristine.   Neuropathy   bilat feet   Osteoarthritis   PAF (paroxysmal atrial fibrillation) (HCC)   PONV (postoperative nausea and vomiting)   OPOIDS severe PONV   Post-operative nausea and vomiting   right L5-S1 MIS TLIF, add on fusion; 1/10/2023 06/11/2020   Sacroiliac joint dysfunction of right side 03/24/2022     Current Outpatient Medications   Medication Sig Dispense Refill   nitrofurantoin monohydrate macro 100 MG Oral  Cap Take 1 capsule (100 mg total) by mouth 2 (two) times daily for 7 days. 14 capsule 0   XARELTO 20 MG Oral Tab TAKE 1 TABLET EVERY DAY WITH FOOD 90 tablet 3   atenolol 25 MG Oral Tab TAKE 1 TABLET ONE TIME DAILY 90 tablet 2   simvastatin 40 MG Oral Tab TAKE 1 TABLET EVERY NIGHT (NEED FASTING LABS) 90 tablet 2     Past Surgical History:   Procedure Laterality Date   BACK SURGERY 11/21/2017   Spinal Fusion L3-L4   BACK SURGERY 06/02/2020   Spinal fusion L4-L5S/P Revision L3-L4 MIS TLIF/PLF, exchange of HW with L3-L4 Fusion   BACK SURGERY Bilateral 08/02/2022   Spine Spur L4,4,5) and Rt L5-S1 hemilami   BACK SURGERY Bilateral 01/10/2023   Dpinsl gudion L4-L5   CATARACT   COLONOSCOPY STOMA DX INCLUDING COLLJ SPEC SPX   EXCISIONAL BIOPSY LEFT 1995   duct excision   QUINN LOCALIZATION WIRE 1 SITE LEFT (CPT=19281)   1993 bn   NEEDLE BIOPSY LEFT   OTHER SURGICAL HISTORY 02/23/1999   Flexible sigmoidoscopy   OTHER SURGICAL HISTORY 05/22/2023   Cysto-Dr. Cordova   TRABECULOPLASTY BY LASER SURGERY 2001   LASIK   UNLISTED PROCEDURE BREAST 1995   L breast bx-benign   UNLISTED PROCEDURE SPINE     Allergies:   Codeine NAUSEA AND VOMITING  Morphine ANAPHYLAXIS, NAUSEA AND VOMITING  Oxycodone NAUSEA AND VOMITING  Comment:severe  Tramadol NAUSEA AND VOMITING  Norco [Hydrocodone-* NAUSEA AND VOMITING  Aspirin OTHER (SEE COMMENTS)  Comment:Blood thinner, contra indicated w/ xarelto  Clonidine OTHER (SEE COMMENTS)  Comment:Reaction: dry mouth  Ibuprofen OTHER (SEE COMMENTS)  Comment:All NSAIDS contra indicated  Family History   Problem Relation Age of Onset   Hypertension Father   Heart Disease Father   Other (CHF) Father   Cancer Mother   stomach ca   Breast Cancer Paternal Grandmother 70   age of dx 70   Alcohol and Other Disorders Associated Paternal Grandfather   Other (Autism) Son   Gastro-Intestinal Disorder Brother   Crohn's disease     Social History:  Social History  Tobacco Use  Smoking status: Never  Smokeless tobacco:  Never  Vaping Use  Vaping status: Never Used  Alcohol use: No  Alcohol/week: 0.0 standard drinks of alcohol  Drug use: Never    REVIEW OF SYSTEMS: She denies any HEENT, cardiovascular, pulmonary, gastrointestinal, genitourinary, musculoskeletal, hematologic, endocrine, or integumentary complaints.    PHYSICAL EXAMINATION:  The patient is alert, oriented, well-nourished, well-developed woman who appears her stated age. On her HEENT examination, Normocephalic/atraumatic, neck supple, no lymphadenopathy, clear conjunctiva, non-icteric.  On examination of her heart and lungs RRR, no JVD and good air entry bilaterally with symmetrical expansion. Her abdomen is soft, non-tender, no masses. Her bilateral lower extremities shows no deformity or significant edema. The right nipples is everted without discharge. There are no dominant masses or significant focal nodularity in the right breast. There are no skin changes on the right side. On the left, there is an oblique incision along the lower outer portion of the left areola. It is healing well without redness or drainage. There is residual palpable mass effect beneath the left areola. There are no other left breast masses or skin changes. The left axilla shows no adenopathy. The right axilla shows no adenopathy.    RADIOLOGIC WORK-UP: radiology films and reports were reviewed.     DATE OF SERVICE: 02.21.2025  BILATERAL BREAST MRI     CLINICAL INDICATION: Left breast subareolar excisional biopsy 2/14/2025 demonstrating invasive  ductal carcinoma.    COMPARISON:Prior mammogram and ultrasound studies including 1/17/2025    MRI contrast Gadobutrol  Vile size 7.5 mL  Amount Injected: 5.5mL  Amount Waste: 2.0 mL    TECHNIQUE: Bilateral breast MRI was performed with a dedicated breast coil. Axial T1, and axial,  coronal, and sagittal STIR images were obtained. 3D VIBE gradient-echo sequences with fat  saturation were obtained in the axial plane before and after dynamic  administration of intravenous  Gadovist. Post-processing images include post-contrast subtraction, MIP and MPR images. Kinetic  time-signal intensity flow curves were also analyzed using Why Not Give Back software.  ------------------------------------  FINDINGS:     ------------------------------------    Amount of Fibroglandular Tissue: Heterogeneous fibroglandular tissue.    Background Parenchymal Enhancement: Mild.  ------------------------------------  RIGHT BREAST:    *The right breast demonstrates mild background enhancement. A few nonspecific foci are seen  scattered throughout the breast. No obvious abnormal or distinct areas of enhancement are seen.  *The internal mammary chain is normal. Axillary region demonstrates no significant adenopathy..  ------------------------------------  LEFT BREAST:    *The left breast demonstrates subareolar postsurgical changes. There is persistent area of nodular  heterogeneous irregular enhancement behind the nipple which spans at least 2.3 cm posterior to the  nipple.  *Slightly more lateral and deeper in location is a 6 mm oval area of enhancement which is T2  isointense and nonspecific. This is seen best on axial image 54 and sagittal image 101.  *Even further posteriorly, central on the axial view and superior on the lateral view, axial image  62 and sagittal image 110, is another 5 mm oval area of indeterminate enhancement. It is T2  isointense.  *The remainder the left breast demonstrates no abnormal areas of enhancement.  *The internal mammary chain is normal. Axillary region demonstrates no significant adenopathy.  ------------------------------------  OTHER:    The surrounding soft tissues and bony structures demonstrate no acute findings.  ------------------------------------    IMPRESSION      1. Postsurgical changes of the left subareolar region. There is a large persistent area of irregular  nodular enhancement behind the nipple spanning at least 2.3 cm. There are also  2 other smaller  subcentimeter nodules more posteriorly as described above. These are indeterminate. If there is  consideration for breast conservation, further evaluation with MRI guided biopsy of these areas is  recommended.  2. No significant enhancement seen in the right breast.  3. No significant adenopathy seen.    ----------------------------------------  BI-RADS Category 4: Suspicious.  Recommendation: Biopsy should be performed in the absence of clinical contraindication if there is  consideration for breast conservation.    ----------------------------------------    This was interpreted by Wili Patton M.D.    Images reviewed and discussed with them today.    Impression    Impression   IMPRESSION:     1. Possible right upper lobe pulmonary nodule. Recommend further evaluation with contrast-enhanced   chest CT.   2. Clear lungs otherwise   3. Stable prominent bilateral hernan likely secondary to pulmonary hypertension.         Study Result    Narrative   DATE OF SERVICE: 02.25.2025  EXAMINATION: Chest 2 views    FINDINGS:    Comparison is made to the prior exam from 7/20/2024. Stable prominent bilateral hernan are seen. There  is a probable pulmonary nodule in the right upper lobe. The lungs are otherwise clear. There is no  pulmonary edema. There is no pleural effusion or pneumothorax. The cardiomediastinal silhouette is  within normal limits. Lumbar spine fusion changes are incompletely imaged.   Impression   IMPRESSION:    1. Possible right upper lobe pulmonary nodule. Recommend further evaluation with contrast-enhanced  chest CT.  2. Clear lungs otherwise  3. Stable prominent bilateral hernan likely secondary to pulmonary hypertension.     CT of chest, abdomen, and pelvis pending.    PATHOLOGY (also previously discussed with them on the phone):   Case Report   Surgical Pathology Report Case: [id number]   Authorizing Provider: Festus Schreiber MD Collected: 02/14/2025 11:13 AM   Ordering Location: [facility] Received: 02/14/2025 11:13 AM   Pathologist: Emma Marroquin MD   Specimen: Breast, left, A) Biopsy of Left Areolar Mass     Final Diagnosis   Left areolar mass, excision:  -Invasive ductal carcinoma measuring 14 mm (1.4 cm).  -Grade 2 (Tubules: 3, Nuclei: 2, Mitoses: 1).  -Ductal carcinoma in situ (DCIS), nuclear grade 2, solid type.  -Invasive carcinoma involves the 6-9:00 margin over an area spanning 7 mm (0.7 cm). Invasive carcinoma is also present 1 mm (0.1 cm) from the deep margin and DCIS is present < 1mm (< 0.1 cm) from the 6-9:00 margin.  -No definitive lymphovascular invasion is identified.  -Invasive carcinoma involves skin dermis, however skin epidermis appears uninvolved.      Electronically signed by Emma Marroquin MD on 2/19/2025 at 2:41 PM   Comments   Immunohistochemical stains for breast tumor markers (ER, IN, HER2, and Ki-67) have been ordered and the results will be reported as an addendum.    Dr. CATARINA Todd has reviewed the case and concurs with the above diagnosis.    The case findings were discussed with Dr. STACY Schreiber on 2/19/25 at 2:35pm by Dr. DARIO Baxter.    Synoptic Checklist   INVASIVE CARCINOMA OF THE BREAST: Resection  8th Edition - Protocol posted: 6/21/2023INVASIVE CARCINOMA OF THE BREAST: RESECTION - All Specimens  SPECIMEN   Procedure Excision (less than total mastectomy)   Specimen Laterality Left   TUMOR   Histologic Type Invasive carcinoma of no special type (ductal)   Histologic Grade (Sara Histologic Score)   Glandular (Acinar) / Tubular Differentiation Score 3   Nuclear Pleomorphism Score 2   Mitotic Rate Score 1   Overall Grade Grade 2 (scores of 6 or 7)   Tumor Size Greatest dimension of largest invasive focus (Millimeters): 14 mm   Ductal Carcinoma In Situ (DCIS) Present   Negative for extensive intraductal component (EIC)   Lymphatic and / or Vascular Invasion Not identified   Treatment Effect in the Breast No known presurgical  therapy   MARGINS   Margin Status for Invasive Carcinoma Invasive carcinoma present at margin   Margin(s) Involved by Invasive Carcinoma Positive along the 6-9:00 margin over an area spanning 7 mm   Distance from Invasive Carcinoma to Posterior Margin 1 mm   Margin Status for DCIS All margins negative for DCIS   Distance from DCIS to Closest Margin Less than: 1 mm   Closest Margin(s) to DCIS 6-9:00 margin   REGIONAL LYMPH NODES   Regional Lymph Node Status Not applicable (no regional lymph nodes submitted or found)   pTNM CLASSIFICATION (AJCC 8th Edition)   Reporting of pT, pN, and (when applicable) pM categories is based on information available to the pathologist at the time the report is issued. As per the AJCC (Chapter 1, 8th Ed.) it is the managing physician’s responsibility to establish the final pathologic stage based upon all pertinent information, including but potentially not limited to this pathology report.   pT Category pT1c   pN Category pN not assigned (no nodes submitted or found)   .     Ancillary Studies   Quantitative Immunohistochemistry:   Material: Block A5  Population: Tumor Cells    Antibody & Clone Result Interpretation   Estrogen Receptor - SP1 100 % Positive Cells Strong Positive   Progesterone Receptor - 16 >95 % Positive Cells Strong Positive   KI-67 - MM1 Up to 25 % Positive Cells High   Her2 - CB11 1+ Negative       Guidelines for HER2 IHC  3+: based on circumferential membrane staining that is complete, intense, and in >10% of tumor cells.  2+: based on weak to moderate complete membrane staining observed in >10% of tumor cells.  1+: as defined by incomplete membrane staining that is faint/barely perceptible and within > 10% of the tumor cells  0: as defined by no staining observed or membrane staining that is incomplete and is faint/barely perceptible and within <= 10% of the invasive tumor cells    Internal control is appropriately positive     Interpreted by: Emma cain  MD Sahil    Note: The quantitation of the Estrogen and Progesterone receptors and Ki-67 is performed by a pathologist via microscopic analysis. Because of formalin fixation and paraffin embedding of the tissue, the actual levels of estrogen and progesterone receptor may be higher than is detected in this case.    Methodology: Immunohistochemical stains are performed on formalin-fixed, paraffin-embedded tissue sections. Deparaffinization, antigen retrieval, and staining utilizes the automated Leica Rockwell III immunohistochemistry platform. A proprietary, non-biotin, polymer-based detection system (Bond Polymer Refine DetectionTM ) is employed. All antibodies are validated by [facility] Department of Pathology to document appropriate staining reactions. Positive controls are utilized and show appropriate reactivity. Validation, performance, reporting,  and proficiency testing regarding the assay is in compliance with the published ASCO-CAP Guidelines (2018).    Clinical Information   N63.0 Mass Of Nipple   Gross Description   Labeled with patient's name and medical record number, biopsy left areolar mass, left breast, received in formalin: Specimen consists of an ellipse of skin with firm underlying subcutaneous tissue measuring 2.7 x 1.3 x 1.5 cm. There is a suture present at the nipple margin. The specimen is inked as follows, 12-3:00 blue, 3-6:00 red, 6-9:00 green, 9-12:00 orange and the deep margins are inked black. The specimen is serially sectioned to demonstrates a firm pale pink-tan cut surface. The specimen is submitted in toto as follows,    A1: 3:00 tip  A2: 9:00 tip  A3 through A5: The remaining tissue from 3:00 to 9:00    Tissue excision time: 10:45 AM  Cold ischemic time: 5 minutes  Time in formalin: Not less than 6 or exceeding 72 hours. (CP)   Interpretation   Malignant Abnormal   Electronically signed by Emma Marroquin MD on 2/19/2025 at 2:41 PM   Sign Out  Location   Technical components performed at [facility] [id number] [address]. Professional components performed at [facility] [id number] [address] [facility] [facility] [address],[place], [place].     IMPRESSION: Newly diagnosed left breast infiltrating ductal carcinoma, clinically T2, N0 (involves the dermis of the areola), ER and NV positive, HER2 negative. Positive margin, as expected, given essentially incisional biopsy/lumpectomy. Right lung nodule with further workup pending.    DISCUSSION AND PLAN: We discussed the diagnosis, workup, staging, and treatment of breast cancer.    We reviewed treatment modalities, including surgery, chemotherapy, radiation, immunotherapy, and endocrine therapy. With regard to surgical options, lumpectomy and mastectomy was discussed. Axillary lydia staging with sentinel techniques was discussed. We discussed bleeding, infection, drains, numbness, pain, nerve injury, lymphedema, need for additional surgery for margins or completion lymphadenectomy, cosmesis, anesthesia, hospital stay, recovery, etc. Breast reconstruction and impact of radiation on healing and future surgery were reviewed.    We discussed her case in detail. We discussed the reason I did not remove the entire nipple areolar complex, as we did not have a definitive diagnosis going into the surgery. As a consequence, there is residual beneath the nipple and areola. We discussed the 2 other indeterminant areas in the left breast, that would require preoperative biopsy if lumpectomy were to be further entertained. My recommendation would be to consider left mastectomy surgery, in which case the additional biopsies would not be required. They have discussed this with Dr. Benites yesterday and have come to the same conclusion themselves. Furthermore, with the involvement of the nipple and areola, many surgeons would require mastectomy based on that alone. We did discuss the potential role of central lumpectomy, and pictures were  drawn. If she turns out to have metastatic disease (for instance, in the lung), then we may forego surgery in favor of palliative treatment.    Mastectomy surgery was discussed in detail. We discussed the nature of the incision, the flat skin over the chest wall, reconstruction (she declines, and I agree), numbness, postoperative drain, bleeding, infection, ultimate prosthesis, physical therapy to preserve range of motion, high likelihood of clear margins, unlikely role of radiation therapy postoperatively, endocrine therapy, etc. We discussed the role or not a sentinel lymph node biopsy. She would likely benefit from overnight stay in the hospital postoperatively. The breast MRI schedulers were notified of the decision to forego the additional biopsies.    We discussed survivorship issues, including physical therapy, lymphedema, surveillance with examination and imaging, etc.    Plan: left total mastectomy. Gen, SCDs, abx, hold Xarelto for 2 days, take the atenolol.

## 2025-03-28 NOTE — ANESTHESIA PREPROCEDURE EVALUATION
Anesthesia PreOp Note    HPI:     Kanwal Mason is a 80 year old female who presents for preoperative consultation requested by: Festus Schreiber MD    Date of Surgery: 3/28/2025    Procedure(s):  Left breast mastectomy  Indication: Malignant neoplasm of areola of left breast in female, estrogen receptor positive    Relevant Problems   No relevant active problems       NPO:  Last Liquid Consumption Date: 03/28/25  Last Liquid Consumption Time: 0530  Last Solid Consumption Date: 03/27/25  Last Solid Consumption Time: 2200  Last Liquid Consumption Date: 03/28/25          History Review:  Patient Active Problem List    Diagnosis Date Noted    S/P lumbar spinal fusion 07/24/2024    Sacroiliac joint dysfunction of right side 03/24/2022    Secondary hypercoagulable state (HCC) 12/07/2020    S/P lumbar fusion 06/11/2020    Lumbar herniated disc 06/02/2020    Other osteoporosis without current pathological fracture 11/06/2019    Aortic ectasia 02/03/2019    Chronic right-sided low back pain without sciatica 11/12/2018    Paroxysmal atrial fibrillation (HCC) 12/08/2017    Anticoagulated for AFIB  12/08/2017    Lumbar foraminal stenosis 12/05/2017    Lumbar post-laminectomy syndrome 11/06/2017    Painful orthopaedic hardware (HCC) 06/13/2017    Optic neuropathy, 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    Hypercholesterolemia     HTN (hypertension)        Past Medical History:    Arrhythmia    AFib    Arthritis of facet joint of lumbar spine    Back problem    Bulging disc    C5-6    Cancer (HCC)    left breast ca    Cataract    Cervical spinal stenosis    C5-6, C6-7    Chickenpox    Diverticulosis of large intestine    DJD (degenerative joint disease), cervical    Esophageal reflux    Hemorrhoids    High blood pressure    High cholesterol    Hypercholesterolemia    Hypothenar muscle atrophy    R hand    Impaired vision    Internal hemorrhoids    Kidney stone    small right    Lordosis    cervical spine    Lumbosacral spondylosis without myelopathy     Measles    Osteoarthritis    PAF (paroxysmal atrial fibrillation) (HCC)    Pneumonia    Primary osteoarthritis of right foot    s/p right L3-L5 hardware removal; 3/9/2021    Sensorineural hearing loss    Urinary frequency    Vaginal cyst    Vertigo    Visual impairment    readers    Whooping cough       Past Surgical History:   Procedure Laterality Date    Back surgery  11/21/2017    Back surgery  06/02/2020    S/P Revision L3-L4 MIS TLIF/PLF, exchange of HW with L3-L4 Fusion    Breast surgery procedure unlisted  1995    L breast bx-benign    Cataract      Colonoscopy      Excisional biopsy left  1995    duct excision    Laser surgery of eye  2001    LASIK    Gabriela localization wire 1 site left (cpt=19281)      1993 bn    Needle biopsy left      Other surgical history  02/23/1999    Flexible sigmoidoscopy    Spine surgery procedure unlisted      Spine surgery procedure unlisted  07/24/2024    right side Lumbar 2 - 3 extreme lateral interbody fusion with posterior spinal fusion Lumbar 2 - 3 laminotomy left side, Lumbar 2 - 3, Lumbar 3 - 4 instrumented fusion - Madelia       Prescriptions Prior to Admission[1]  Current Medications and Prescriptions Ordered in Epic[2]    Allergies[3]    Family History   Problem Relation Age of Onset    Hypertension Father     Heart Disease Father     Other (CHF) Father     Cancer Mother         stomach ca    Breast Cancer Paternal Grandmother 70        age of dx 70    Alcohol and Other Disorders Associated Paternal Grandfather     Other (Autism) Son     Gastro-Intestinal Disorder Brother         Crohn's disease     Social History     Socioeconomic History    Marital status:     Number of children: 3   Occupational History    Occupation: retired teacher     Comment: health and PE    Occupation:    Tobacco Use    Smoking status: Never    Smokeless tobacco: Never   Vaping Use    Vaping status: Never Used   Substance and Sexual Activity    Alcohol use: No      Alcohol/week: 0.0 standard drinks of alcohol    Drug use: Never    Sexual activity: Not Currently     Partners: Male   Other Topics Concern    Caffeine Concern Yes    Exercise Yes     Comment: hike 1 x week    Seat Belt Yes       Available pre-op labs reviewed.             Vital Signs:  Body mass index is 22.67 kg/m².   height is 1.549 m (5' 1\") and weight is 54.4 kg (120 lb). Her oral temperature is 97.9 °F (36.6 °C). Her blood pressure is 155/67 and her pulse is 52. Her respiration is 20 and oxygen saturation is 97%.   Vitals:    03/25/25 1330 03/28/25 0623   BP:  155/67   Pulse:  52   Resp:  20   Temp:  97.9 °F (36.6 °C)   TempSrc:  Oral   SpO2:  97%   Weight: 53.1 kg (117 lb) 54.4 kg (120 lb)   Height: 1.549 m (5' 1\") 1.549 m (5' 1\")        Anesthesia Evaluation     Patient summary reviewed    Airway   Mallampati: II  TM distance: >3 FB  Neck ROM: full  Dental - Dentition appears grossly intact     Pulmonary - negative ROS    breath sounds clear to auscultation  Cardiovascular   Exercise tolerance: good  (+) hypertension    ECG reviewed  Rhythm: regular  Rate: normal    Neuro/Psych    (+)  neuromuscular disease,        GI/Hepatic/Renal    (+) GERD    Endo/Other - negative ROS   Abdominal  - normal exam                 Anesthesia Plan:   ASA:  2  Plan:   General  Airway:  ETT  Informed Consent Plan and Risks Discussed With:  Patient      I have informed Kanwal KELLY Mason and/or legal guardian or family member of the nature of the anesthetic plan, benefits, risks including possible dental damage if relevant, major complications, and any alternative forms of anesthetic management.   All of the patient's questions were answered to the best of my ability. The patient desires the anesthetic management as planned.  Antonio Mann MD  3/28/2025 7:37 AM  Present on Admission:  **None**           [1]   Medications Prior to Admission   Medication Sig Dispense Refill Last Dose/Taking    XARELTO 20 MG Oral Tab Take 1 tablet  (20 mg total) by mouth daily with food. (Patient taking differently: Take 1 tablet (20 mg total) by mouth every evening.) 90 tablet 3 3/25/2025    acetaminophen 500 MG Oral Tab Take 1 tablet (500 mg total) by mouth every 6 (six) hours as needed for Pain.   3/26/2025    Ascorbic Acid (VITAMIN C) 1000 MG Oral Tab Take 1 tablet (1,000 mg total) by mouth 2 (two) times daily. 60 tablet 0 3/27/2025 at 12:00 PM    simvastatin 20 MG Oral Tab Take 1 tablet (20 mg total) by mouth nightly. (Patient taking differently: Take 2 tablets (40 mg total) by mouth nightly.) 90 tablet 3 3/27/2025 at  9:00 PM    atenolol 25 MG Oral Tab Take 1 tablet (25 mg total) by mouth once daily. (Patient taking differently: Take 1 tablet (25 mg total) by mouth every morning.) 90 tablet 3 3/28/2025 at  5:30 AM    Multiple Vitamins-Minerals (MULTIVITAL) Oral Chew Tab Chew by mouth daily.   3/27/2025 at 12:00 PM   [2]   Current Facility-Administered Medications Ordered in Epic   Medication Dose Route Frequency Provider Last Rate Last Admin    lactated ringers infusion   Intravenous Continuous Festus Schreiber MD 20 mL/hr at 03/28/25 0712 New Bag at 03/28/25 0712    metoprolol tartrate (Lopressor) tab 25 mg  25 mg Oral Once PRN Festus Schreiber MD        ceFAZolin (Ancef) 2g in 10mL IV syringe premix  2 g Intravenous Once Festus Schreiber MD         No current [facility]-ordered outpatient medications on file.   [3]   Allergies  Allergen Reactions    Codeine NAUSEA AND VOMITING    Morphine ANAPHYLAXIS and NAUSEA AND VOMITING    Oxycodone NAUSEA AND VOMITING     severe    Tramadol NAUSEA AND VOMITING    Aspirin OTHER (SEE COMMENTS)     Blood thinner    Blood thinner, contra indicated w/ xarelto    Clonidine OTHER (SEE COMMENTS)     Reaction: dry mouth      Hydrocodone OTHER (SEE COMMENTS)     Nausea/vomiting    Ibuprofen OTHER (SEE COMMENTS)     All NSAIDS contra indicated

## 2025-03-28 NOTE — ANESTHESIA PROCEDURE NOTES
Airway  Date/Time: 3/28/2025 7:48 AM  Urgency: Elective      General Information and Staff    Patient location during procedure: OR  Anesthesiologist: Antonio Mann MD  Performed: anesthesiologist   Performed by: Antonio Mann MD  Authorized by: Antonio Mann MD      Indications and Patient Condition  Indications for airway management: anesthesia  Sedation level: deep  Preoxygenated: yes  Patient position: sniffing  Mask difficulty assessment: 0 - not attempted    Final Airway Details  Final airway type: endotracheal airway      Successful airway: ETT  Cuffed: yes   Successful intubation technique: Video laryngoscopy  Facilitating devices/methods: intubating stylet  Endotracheal tube insertion site: oral  Blade: GlideScope  Blade size: #3  ETT size (mm): 7.0    Cormack-Lehane Classification: grade I - full view of glottis  Placement verified by: capnometry   Measured from: lips  ETT to lips (cm): 21  Number of attempts at approach: 1

## 2025-03-28 NOTE — ANESTHESIA POSTPROCEDURE EVALUATION
Patient: Kanwal Mason    Procedure Summary       Date: 03/28/25 Room / Location: [facility] MAIN OR 08 / [facility] MAIN OR    Anesthesia Start: 0741 Anesthesia Stop: 0928    Procedure: Left breast mastectomy (Left: Breast) Diagnosis: (Malignant neoplasm of areola of left breast in female, estrogen receptor positive)    Surgeons: Festus Schreiber MD Anesthesiologist: Antonio Mann MD    Anesthesia Type: general ASA Status: 2            Anesthesia Type: general    Vitals Value Taken Time   /78 03/28/25 1056   Temp 97.5 °F (36.4 °C) 03/28/25 1038   Pulse 71 03/28/25 1056   Resp 18 03/28/25 1056   SpO2 96 % 03/28/25 1056       EM AN Post Evaluation:   Patient Evaluated in PACU  Patient Participation: complete - patient participated  Level of Consciousness: awake  Pain Score: 0  Pain Management: adequate  Airway Patency:patent  Dental exam unchanged from preop  Yes    Nausea/Vomiting: none  Cardiovascular Status: acceptable and hemodynamically stable  Respiratory Status: acceptable and room air  Postoperative Hydration stable      Antonio Mann MD  3/28/2025 11:03 AM

## 2025-03-29 VITALS
HEIGHT: 61 IN | RESPIRATION RATE: 18 BRPM | WEIGHT: 120 LBS | TEMPERATURE: 98 F | OXYGEN SATURATION: 98 % | HEART RATE: 61 BPM | BODY MASS INDEX: 22.66 KG/M2 | SYSTOLIC BLOOD PRESSURE: 131 MMHG | DIASTOLIC BLOOD PRESSURE: 84 MMHG

## 2025-03-29 LAB
ANION GAP SERPL CALC-SCNC: 8 MMOL/L (ref 0–18)
BASOPHILS # BLD AUTO: 0.03 X10(3) UL (ref 0–0.2)
BASOPHILS NFR BLD AUTO: 0.3 %
BUN BLD-MCNC: 16 MG/DL (ref 9–23)
BUN/CREAT SERPL: 22.9 (ref 10–20)
CALCIUM BLD-MCNC: 8.9 MG/DL (ref 8.7–10.4)
CHLORIDE SERPL-SCNC: 107 MMOL/L (ref 98–112)
CO2 SERPL-SCNC: 26 MMOL/L (ref 21–32)
CREAT BLD-MCNC: 0.7 MG/DL
DEPRECATED RDW RBC AUTO: 46.5 FL (ref 35.1–46.3)
EGFRCR SERPLBLD CKD-EPI 2021: 87 ML/MIN/1.73M2 (ref 60–?)
EOSINOPHIL # BLD AUTO: 0.02 X10(3) UL (ref 0–0.7)
EOSINOPHIL NFR BLD AUTO: 0.2 %
ERYTHROCYTE [DISTWIDTH] IN BLOOD BY AUTOMATED COUNT: 14.1 % (ref 11–15)
GLUCOSE BLD-MCNC: 98 MG/DL (ref 70–99)
HCT VFR BLD AUTO: 36.8 %
HGB BLD-MCNC: 12.2 G/DL
IMM GRANULOCYTES # BLD AUTO: 0.03 X10(3) UL (ref 0–1)
IMM GRANULOCYTES NFR BLD: 0.3 %
LYMPHOCYTES # BLD AUTO: 1.53 X10(3) UL (ref 1–4)
LYMPHOCYTES NFR BLD AUTO: 16 %
MCH RBC QN AUTO: 29.9 PG (ref 26–34)
MCHC RBC AUTO-ENTMCNC: 33.2 G/DL (ref 31–37)
MCV RBC AUTO: 90.2 FL
MONOCYTES # BLD AUTO: 1.13 X10(3) UL (ref 0.1–1)
MONOCYTES NFR BLD AUTO: 11.8 %
NEUTROPHILS # BLD AUTO: 6.83 X10 (3) UL (ref 1.5–7.7)
NEUTROPHILS # BLD AUTO: 6.83 X10(3) UL (ref 1.5–7.7)
NEUTROPHILS NFR BLD AUTO: 71.4 %
OSMOLALITY SERPL CALC.SUM OF ELEC: 293 MOSM/KG (ref 275–295)
PLATELET # BLD AUTO: 312 10(3)UL (ref 150–450)
POTASSIUM SERPL-SCNC: 4.2 MMOL/L (ref 3.5–5.1)
RBC # BLD AUTO: 4.08 X10(6)UL
SODIUM SERPL-SCNC: 141 MMOL/L (ref 136–145)
WBC # BLD AUTO: 9.6 X10(3) UL (ref 4–11)

## 2025-03-29 PROCEDURE — 80048 BASIC METABOLIC PNL TOTAL CA: CPT | Performed by: HOSPITALIST

## 2025-03-29 PROCEDURE — 85025 COMPLETE CBC W/AUTO DIFF WBC: CPT | Performed by: HOSPITALIST

## 2025-03-29 RX ORDER — RIVAROXABAN 20 MG/1
20 TABLET, FILM COATED ORAL
Qty: 90 TABLET | Refills: 3 | Status: SHIPPED | OUTPATIENT
Start: 2025-04-01

## 2025-03-29 RX ORDER — RIVAROXABAN 20 MG/1
20 TABLET, FILM COATED ORAL
Qty: 90 TABLET | Refills: 3 | Status: SHIPPED | OUTPATIENT
Start: 2025-04-02 | End: 2025-03-29

## 2025-03-29 NOTE — PROGRESS NOTES
[facility]  part of [facility]    Progress Note    Kanwal Mason Patient Status:  Outpatient in a Bed    3/19/1945 MRN [id number]   Location [facility] 4W/SW/SE Attending Festus Schreiber MD   Hosp Day # 0 PCP Natalie Marshall MD     Subjective:   She looks good.  She is ambulating around her room without issue.  She appears almost euphoric.  Very little pain, some with movement of her left upper extremity.  She is not requiring pain medication now thank goodness.  No nausea or vomiting now.  She has concerns about the visible bruising above the Ace wrap and a \"ligament\" she can feel there.  Unaccompanied.    Objective:   Vital Signs:  Blood pressure 144/85, pulse 74, temperature 98.7 °F (37.1 °C), temperature source Oral, resp. rate 19, height 5' 1\" (1.549 m), weight 120 lb (54.4 kg), SpO2 98%, not currently breastfeeding.     General:  No acute distress. Alert and oriented x 3.  The Ace wrap is intact.  The area of the incision was reviewed, and the.  It appears flat without seroma or hematoma.  Indeed there is a small amount of ecchymosis superior to the top edge of the Ace wrap.  The \"ligament\" that she is feeling is the drain tube beneath the upper flap.  She and I felt it together for confirmation.  The drain is serosanguineous, and I stripped it.        Intake/Output Summary (Last 24 hours) at 3/29/2025 1110  Last data filed at 3/29/2025 0900  Gross per 24 hour   Intake 100 ml   Output 300 ml   Net -200 ml        Results:   Lab Results   Component Value Date    WBC 9.6 2025    HGB 12.2 2025    HCT 36.8 2025    .0 2025    CREATSERUM 0.70 2025    BUN 16 2025     2025    K 4.2 2025     2025    CO2 26.0 2025    GLU 98 2025    CA 8.9 2025    ALB 4.4 2022    ALKPHO 62 2022    BILT 0.21 2022    TP 6.4 2022    AST 17 2022    ALT 11 2022    TSH 0.773 2021     B12 525 08/30/2018       Pathology pending.    Assessment & Plan:   Status post left total mastectomy for left breast cancer    She is doing very well.  With regard to her concerns and focus on potential postoperative nausea and vomiting, she currently has none.  She is extremely relieved about that.  She is also pleased with how little pain she is having.  We discussed that she can take Tylenol, but she says it does not do anything for her.  She is declining any pain medication prescriptions.  We discussed gabapentin, and she says she has taken it in the past for back related issues, and it did not help much.  Although she is not supposed to take NSAIDs because of her Xarelto use, we discussed that she could take an Excedrin or 2 if need be over the upcoming couple days for pain.  These do work for her, and she is permitted to take them for the occasional migraine she gets.    The wound looks good.  No signs of hematoma or seroma.  The ecchymosis is in the area where the drain is beneath the upper flap.    Okay for discharge.  She can sponge bathe until the drain is removed.  I will call her next week when the pathology is available.  I will elicit from her at that time what the drain outputs are and arrange her follow-up appointment to coincide with the drain removal.  She can resume the Xarelto on Tuesday, April 1.  We discussed relative left upper extremity precautions.  She did not have any sentinel lymph nodes removed or axillary dissection.    Festus Lambert MD  3/29/2025

## 2025-03-29 NOTE — PLAN OF CARE
Hard to touch swelling noted above incision, per day shift GYPSY Russo MD aware. Voiding. No acute issues overnight.   Problem: Patient Centered Care  Goal: Patient preferences are identified and integrated in the patient's plan of care  Description: Interventions:- What would you like us to know as we care for you?   Provide timely, complete, and accurate information to patient/family- Incorporate patient and family knowledge, values, beliefs, and cultural backgrounds into the planning and delivery of care- Encourage patient/family to participate in care and decision-making at the level they choose- Honor patient and family perspectives and choices  Outcome: Progressing     Problem: PAIN - ADULT  Goal: Verbalizes/displays adequate comfort level or patient's stated pain goal  Description: INTERVENTIONS:- Encourage pt to monitor pain and request assistance- Assess pain using appropriate pain scale- Administer analgesics based on type and severity of pain and evaluate response- Implement non-pharmacological measures as appropriate and evaluate response- Consider cultural and social influences on pain and pain management- Manage/alleviate anxiety- Utilize distraction and/or relaxation techniques- Monitor for opioid side effects- Notify MD/LIP if interventions unsuccessful or patient reports new pain- Anticipate increased pain with activity and pre-medicate as appropriate  Outcome: Progressing     Problem: SAFETY ADULT - FALL  Goal: Free from fall injury  Description: INTERVENTIONS:- Assess pt frequently for physical needs- Identify cognitive and physical deficits and behaviors that affect risk of falls.- [place] fall precautions as indicated by assessment.- Educate pt/family on patient safety including physical limitations- Instruct pt to call for assistance with activity based on assessment- Modify environment to reduce risk of injury- Provide assistive devices as appropriate- Consider OT/PT consult to assist with  strengthening/mobility- Encourage toileting schedule  Outcome: Progressing

## 2025-03-29 NOTE — PLAN OF CARE
Tylenol covering pain. Left arm precautions in place. Incisions CDI with ace bandage in place. POONAM drain emptied, output charted. Compressed to bulb suction. Taught drain care with patient, she verbalized understanding with return demonstration. All discharge instructions given and explained to patient. Kanwal verbalized understanding. Voiding freely, passing flatus. Good appetite. Safety plan in place, calling appropriately for assistance. Discharged home with .     Problem: Patient Centered Care  Goal: Patient preferences are identified and integrated in the patient's plan of care  Description: Interventions:- What would you like us to know as we care for you?   - Provide timely, complete, and accurate information to patient/family- Incorporate patient and family knowledge, values, beliefs, and cultural backgrounds into the planning and delivery of care- Encourage patient/family to participate in care and decision-making at the level they choose- Honor patient and family perspectives and choices  Outcome: Progressing     Problem: PAIN - ADULT  Goal: Verbalizes/displays adequate comfort level or patient's stated pain goal  Description: INTERVENTIONS:- Encourage pt to monitor pain and request assistance- Assess pain using appropriate pain scale- Administer analgesics based on type and severity of pain and evaluate response- Implement non-pharmacological measures as appropriate and evaluate response- Consider cultural and social influences on pain and pain management- Manage/alleviate anxiety- Utilize distraction and/or relaxation techniques- Monitor for opioid side effects- Notify MD/LIP if interventions unsuccessful or patient reports new pain- Anticipate increased pain with activity and pre-medicate as appropriate  Outcome: Progressing     Problem: SAFETY ADULT - FALL  Goal: Free from fall injury  Description: INTERVENTIONS:- Assess pt frequently for physical needs- Identify cognitive and physical deficits  and behaviors that affect risk of falls.- [place] fall precautions as indicated by assessment.- Educate pt/family on patient safety including physical limitations- Instruct pt to call for assistance with activity based on assessment- Modify environment to reduce risk of injury- Provide assistive devices as appropriate- Consider OT/PT consult to assist with strengthening/mobility- Encourage toileting schedule  Outcome: Progressing     Problem: SKIN/TISSUE INTEGRITY - ADULT  Goal: Incision(s), wounds(s) or drain site(s) healing without S/S of infection  Description: INTERVENTIONS:- Assess and document risk factors for pressure ulcer development- Assess and document skin integrity- Assess and document dressing/incision, wound bed, drain sites and surrounding tissue- Implement wound care per orders- Initiate isolation precautions as appropriate- Initiate Pressure Ulcer prevention bundle as indicated  Outcome: Progressing

## 2025-03-29 NOTE — DISCHARGE SUMMARY
[facility] Discharge Summary   Patient ID:  Kanwal Mason  [id number]  80 year old  3/19/1945    Admit date: 3/28/2025    Discharge date and time: 3/29/2025     Attending Physician: Festus Schreiber MD     Primary Care Physician: Natalie Marshall MD     Admit Dx: Malignant neoplasm of areola of left breast in female, estrogen receptor positive  Cancer of left breast, stage 1 (HCC)      Discharge Diagnosis   L breast cancer sp mastectomy  Afib  HTN  HL  Narcotic related nausea  Spinal stenosis      Discharged Condition: stable    Disposition: home    Important follow up:  Natalie Marshall MD  [address]  [address]  [facility] [address]  [phone]    Go on 4/1/2025      Festus Schreiber MD  [address]  [address]  [place] [address]  [phone]    Go on 4/9/2025  for drain removal        Please refer to prior H&P on admission date.    Hospital Course:   80 year old female with PMH sig for afib on xarelto, HTN, HL here sp L mastectomy     L breast cancer sp Left total mastectomy   - d/w DR Schreiber- dc today f/u appt timing to be determined in outpatient will call patient w path assess drain output and determine timing for f/u apt     Afib  - d/w DR Schreiber rec holding xarelto few days post op d/w patient     HTN HL, aortic atherosclerosis  - home meds as able BBstatin     Spinal stenosis  L optic neuropathy  - no issues in house    Day of discharge Exam   Vitals:    03/29/25 1226   BP: 131/84   Pulse: 61   Resp: 18   Temp: 98.2 °F (36.8 °C)       Exam on day of discharge:  Gen: No acute distress  CV: RRR  Lungs: CTAB, good respiratory effort  Abdomen: s/nt/nd  Neuro: no focal deficits      Discharge meds     Medication List        CHANGE how you take these medications      atenolol 25 MG Tabs  Commonly known as: Tenormin  Take 1 tablet (25 mg total) by mouth once daily.  What changed: when to take this     Xarelto 20 MG Tabs  Generic drug: rivaroxaban  Take 1 tablet (20 mg total) by mouth daily  with food. Hold until DR Schreiber ok to resume- on 4/1/25  Start taking on: April 1, 2025  What changed:   additional instructions  These instructions start on April 1, 2025. If you are unsure what to do until then, ask your doctor or other care provider.            CONTINUE taking these medications      acetaminophen 500 MG Tabs  Commonly known as: Tylenol Extra Strength     Multivital Chew     simvastatin 20 MG Tabs  Commonly known as: Zocor  Take 1 tablet (20 mg total) by mouth nightly.     vitamin C 1000 MG Tabs  Take 1 tablet (1,000 mg total) by mouth 2 (two) times daily.               Where to Get Your Medications        These medications were sent to [id number] - [place], [place] - [address] AT [facility] [address], [phone], [phone]  [id number], [facility] [address]      Phone: [phone]   Xarelto 20 MG Tabs         Consults: IP CONSULT TO BREAST CARE COORDINATOR  IP CONSULT TO HOSPITALIST    Radiology: No results found.     Operative Procedures: Procedure(s) (LRB):  Left breast mastectomy (Left)       Total Time Coordinating Care: > than 30 minutes    Patient had opportunity to ask questions and state understand and agree with therapeutic plan as outlined      Anahi Lord MD  [facility] [place] [facility]  [phone]  3/29/2025  8:28 AM

## 2025-03-29 NOTE — DISCHARGE INSTRUCTIONS
Don't lift anything that weighs more than 5 pounds until your healthcare provider says it's OK.  Don't do any strenuous activities, such as mowing the lawn, using a vacuum , working out, or playing sports. Listen to your body. If an activity causes pain, stop.  Limit your activity to short walks. Slowly increase your pace and distance as you feel able.  Call the doctor if you run a fever greater than 100.5, or you detect new drainage from incision area, or if your pain is severe.       Restart your Xarelto on Wednesday April 2, 2025    Empty your Bulb Drain every 4-6 hours and record the output with time and date. Do this until you see your surgeon for follow-up appt.

## 2025-04-30 RX ORDER — ANASTROZOLE 1 MG/1
1 TABLET ORAL DAILY
COMMUNITY

## 2025-04-30 RX ORDER — SOLIFENACIN SUCCINATE 5 MG/1
5 TABLET, FILM COATED ORAL DAILY
COMMUNITY
Start: 2025-03-04

## 2025-04-30 RX ORDER — SIMVASTATIN 40 MG
40 TABLET ORAL NIGHTLY
COMMUNITY

## 2025-05-02 ENCOUNTER — EKG ENCOUNTER (OUTPATIENT)
Dept: LAB | Facility: HOSPITAL | Age: 80
End: 2025-05-02
Attending: INTERNAL MEDICINE
Payer: MEDICARE

## 2025-05-02 DIAGNOSIS — Z01.818 PRE-OP TESTING: ICD-10-CM

## 2025-05-02 LAB
ATRIAL RATE: 59 BPM
P AXIS: 58 DEGREES
P-R INTERVAL: 170 MS
Q-T INTERVAL: 430 MS
QRS DURATION: 98 MS
QTC CALCULATION (BEZET): 425 MS
R AXIS: -50 DEGREES
T AXIS: 22 DEGREES
VENTRICULAR RATE: 59 BPM

## 2025-05-02 PROCEDURE — 93005 ELECTROCARDIOGRAM TRACING: CPT

## 2025-05-02 PROCEDURE — 93010 ELECTROCARDIOGRAM REPORT: CPT | Performed by: INTERNAL MEDICINE

## 2025-05-06 NOTE — H&P
Preprocedure Note    Please refer to my progress note dated 4/24/25. There has been no interval change.     Kanwal Mason is a 80 year old female with a history of breast cancer, hypertension, hyperlipidemia, pAF, neuropathy who was noted on CT and PET scan to have a hypermetabolic right upper lobe nodule and hypermetabolic hilar and mediastinal nodes. She underwent CT-guided biopsy of the right upper lobe nodule which showed caseating granulomas. She is now here for lymph node biopsy.    Informed consent was obtained for bronchoscopy with EBUS-TBNA biopsy under general anesthesia.      Jorge Das M.D.  Pulmonary/Critical Care

## 2025-05-07 ENCOUNTER — ANESTHESIA (OUTPATIENT)
Dept: ENDOSCOPY | Facility: HOSPITAL | Age: 80
End: 2025-05-07
Payer: MEDICARE

## 2025-05-07 ENCOUNTER — HOSPITAL ENCOUNTER (OUTPATIENT)
Facility: HOSPITAL | Age: 80
Setting detail: HOSPITAL OUTPATIENT SURGERY
Discharge: HOME | End: 2025-05-07
Attending: INTERNAL MEDICINE | Admitting: INTERNAL MEDICINE
Payer: MEDICARE

## 2025-05-07 ENCOUNTER — ANESTHESIA EVENT (OUTPATIENT)
Dept: ENDOSCOPY | Facility: HOSPITAL | Age: 80
End: 2025-05-07
Payer: MEDICARE

## 2025-05-07 VITALS
OXYGEN SATURATION: 96 % | RESPIRATION RATE: 16 BRPM | DIASTOLIC BLOOD PRESSURE: 81 MMHG | SYSTOLIC BLOOD PRESSURE: 133 MMHG | TEMPERATURE: 97 F | WEIGHT: 117 LBS | HEIGHT: 61 IN | BODY MASS INDEX: 22.09 KG/M2 | HEART RATE: 73 BPM

## 2025-05-07 DIAGNOSIS — Z01.818 PRE-OP TESTING: Primary | ICD-10-CM

## 2025-05-07 DIAGNOSIS — Z00.00 ROUTINE GENERAL MEDICAL EXAMINATION AT A HEALTH CARE FACILITY: Primary | ICD-10-CM

## 2025-05-07 LAB
BASOPHILS NFR BRONCH: 0 %
EOSINOPHIL NFR BRONCH: 0 %
LYMPHOCYTES NFR BRONCH: 41 %
MONOS+MACROS NFR BRONCH: 17 %
NEUTROPHILS NFR BRONCH: 42 %
TOTAL CELLS COUNTED FLD: 100

## 2025-05-07 PROCEDURE — 87798 DETECT AGENT NOS DNA AMP: CPT | Performed by: INTERNAL MEDICINE

## 2025-05-07 PROCEDURE — 87176 TISSUE HOMOGENIZATION CULTR: CPT | Performed by: INTERNAL MEDICINE

## 2025-05-07 PROCEDURE — 88173 CYTOPATH EVAL FNA REPORT: CPT | Performed by: INTERNAL MEDICINE

## 2025-05-07 PROCEDURE — 88312 SPECIAL STAINS GROUP 1: CPT | Performed by: INTERNAL MEDICINE

## 2025-05-07 PROCEDURE — 87206 SMEAR FLUORESCENT/ACID STAI: CPT | Performed by: INTERNAL MEDICINE

## 2025-05-07 PROCEDURE — 87205 SMEAR GRAM STAIN: CPT | Performed by: INTERNAL MEDICINE

## 2025-05-07 PROCEDURE — 87070 CULTURE OTHR SPECIMN AEROBIC: CPT | Performed by: INTERNAL MEDICINE

## 2025-05-07 PROCEDURE — 87071 CULTURE AEROBIC QUANT OTHER: CPT | Performed by: INTERNAL MEDICINE

## 2025-05-07 PROCEDURE — 89051 BODY FLUID CELL COUNT: CPT | Performed by: INTERNAL MEDICINE

## 2025-05-07 PROCEDURE — 87102 FUNGUS ISOLATION CULTURE: CPT | Performed by: INTERNAL MEDICINE

## 2025-05-07 PROCEDURE — 87556 M.TUBERCULO DNA AMP PROBE: CPT | Performed by: INTERNAL MEDICINE

## 2025-05-07 PROCEDURE — 87075 CULTR BACTERIA EXCEPT BLOOD: CPT | Performed by: INTERNAL MEDICINE

## 2025-05-07 PROCEDURE — 88305 TISSUE EXAM BY PATHOLOGIST: CPT | Performed by: INTERNAL MEDICINE

## 2025-05-07 PROCEDURE — 87116 MYCOBACTERIA CULTURE: CPT | Performed by: INTERNAL MEDICINE

## 2025-05-07 PROCEDURE — 89050 BODY FLUID CELL COUNT: CPT | Performed by: INTERNAL MEDICINE

## 2025-05-07 RX ORDER — METOCLOPRAMIDE HYDROCHLORIDE 5 MG/ML
10 INJECTION INTRAMUSCULAR; INTRAVENOUS EVERY 8 HOURS PRN
Status: DISCONTINUED | OUTPATIENT
Start: 2025-05-07 | End: 2025-05-07 | Stop reason: HOSPADM

## 2025-05-07 RX ORDER — DEXAMETHASONE SODIUM PHOSPHATE 4 MG/ML
VIAL (ML) INJECTION AS NEEDED
Status: DISCONTINUED | OUTPATIENT
Start: 2025-05-07 | End: 2025-05-07 | Stop reason: SURG

## 2025-05-07 RX ORDER — NALOXONE HYDROCHLORIDE 0.4 MG/ML
0.08 INJECTION, SOLUTION INTRAMUSCULAR; INTRAVENOUS; SUBCUTANEOUS AS NEEDED
Status: DISCONTINUED | OUTPATIENT
Start: 2025-05-07 | End: 2025-05-07 | Stop reason: HOSPADM

## 2025-05-07 RX ORDER — SODIUM CHLORIDE, SODIUM LACTATE, POTASSIUM CHLORIDE, CALCIUM CHLORIDE 600; 310; 30; 20 MG/100ML; MG/100ML; MG/100ML; MG/100ML
INJECTION, SOLUTION INTRAVENOUS CONTINUOUS
Status: DISCONTINUED | OUTPATIENT
Start: 2025-05-07 | End: 2025-05-07 | Stop reason: HOSPADM

## 2025-05-07 RX ORDER — LIDOCAINE HYDROCHLORIDE 10 MG/ML
INJECTION, SOLUTION EPIDURAL; INFILTRATION; INTRACAUDAL; PERINEURAL AS NEEDED
Status: DISCONTINUED | OUTPATIENT
Start: 2025-05-07 | End: 2025-05-07 | Stop reason: SURG

## 2025-05-07 RX ORDER — ROCURONIUM BROMIDE 10 MG/ML
INJECTION, SOLUTION INTRAVENOUS AS NEEDED
Status: DISCONTINUED | OUTPATIENT
Start: 2025-05-07 | End: 2025-05-07 | Stop reason: SURG

## 2025-05-07 RX ORDER — PHENYLEPHRINE HCL 10 MG/ML
VIAL (ML) INJECTION AS NEEDED
Status: DISCONTINUED | OUTPATIENT
Start: 2025-05-07 | End: 2025-05-07 | Stop reason: SURG

## 2025-05-07 RX ORDER — ONDANSETRON 2 MG/ML
INJECTION INTRAMUSCULAR; INTRAVENOUS AS NEEDED
Status: DISCONTINUED | OUTPATIENT
Start: 2025-05-07 | End: 2025-05-07 | Stop reason: SURG

## 2025-05-07 RX ORDER — SODIUM CHLORIDE, SODIUM LACTATE, POTASSIUM CHLORIDE, CALCIUM CHLORIDE 600; 310; 30; 20 MG/100ML; MG/100ML; MG/100ML; MG/100ML
INJECTION, SOLUTION INTRAVENOUS CONTINUOUS
Status: DISCONTINUED | OUTPATIENT
Start: 2025-05-07 | End: 2025-05-07

## 2025-05-07 RX ORDER — ONDANSETRON 2 MG/ML
4 INJECTION INTRAMUSCULAR; INTRAVENOUS EVERY 6 HOURS PRN
Status: DISCONTINUED | OUTPATIENT
Start: 2025-05-07 | End: 2025-05-07 | Stop reason: HOSPADM

## 2025-05-07 RX ADMIN — SODIUM CHLORIDE, SODIUM LACTATE, POTASSIUM CHLORIDE, CALCIUM CHLORIDE: 600; 310; 30; 20 INJECTION, SOLUTION INTRAVENOUS at 11:07:00

## 2025-05-07 RX ADMIN — PHENYLEPHRINE HCL 100 MCG: 10 MG/ML VIAL (ML) INJECTION at 10:39:00

## 2025-05-07 RX ADMIN — PHENYLEPHRINE HCL 100 MCG: 10 MG/ML VIAL (ML) INJECTION at 10:44:00

## 2025-05-07 RX ADMIN — ROCURONIUM BROMIDE 40 MG: 10 INJECTION, SOLUTION INTRAVENOUS at 10:06:00

## 2025-05-07 RX ADMIN — DEXAMETHASONE SODIUM PHOSPHATE 8 MG: 4 MG/ML VIAL (ML) INJECTION at 10:15:00

## 2025-05-07 RX ADMIN — ONDANSETRON 4 MG: 2 INJECTION INTRAMUSCULAR; INTRAVENOUS at 10:43:00

## 2025-05-07 RX ADMIN — LIDOCAINE HYDROCHLORIDE 50 MG: 10 INJECTION, SOLUTION EPIDURAL; INFILTRATION; INTRACAUDAL; PERINEURAL at 10:06:00

## 2025-05-07 RX ADMIN — SODIUM CHLORIDE, SODIUM LACTATE, POTASSIUM CHLORIDE, CALCIUM CHLORIDE: 600; 310; 30; 20 INJECTION, SOLUTION INTRAVENOUS at 09:57:00

## 2025-05-07 RX ADMIN — PHENYLEPHRINE HCL 100 MCG: 10 MG/ML VIAL (ML) INJECTION at 10:27:00

## 2025-05-07 NOTE — ANESTHESIA PROCEDURE NOTES
Airway  Date/Time: 5/7/2025 10:07 AM  Reason: elective    Airway not difficult    General Information and Staff   Patient location during procedure: OR  Anesthesiologist: Tez Sullivan MD  Performed: anesthesiologist   Performed by: Tez Sullivan MD  Authorized by: Tez Sullivan MD        Indications and Patient Condition  Indications for airway management: anesthesia  Sedation level: deep      Preoxygenated: yesPatient position: sniffing    Mask difficulty assessment: 1 - vent by mask  Planned trial extubation    Final Airway Details    Final airway type: endotracheal airway    Successful airway: ETT  Cuffed: yes   Successful intubation technique: direct laryngoscopy  Endotracheal tube insertion site: oral  Blade: Lucy  Blade size: #3  ETT size (mm): 8.5    Cormack-Lehane Classification: grade IIA - partial view of glottis  Placement verified by: capnometry   Measured from: lips  ETT to lips (cm): 20  Number of attempts at approach: 1

## 2025-05-07 NOTE — ANESTHESIA PREPROCEDURE EVALUATION
PRE-OP EVALUATION    Patient Name: Kanwal Mason    Admit Diagnosis: ABNORMAL CT CHEST, ABNORMAL PET    Pre-op Diagnosis: ABNORMAL CT CHEST, ABNORMAL PET    BRONCHOSCOPY WITH ENDOBRONCHIAL ULTRASOUND WITH TRANSBRONCHIAL NEEDLE BIOPSY    Anesthesia Procedure: BRONCHOSCOPY WITH ENDOBRONCHIAL ULTRASOUND WITH TRANSBRONCHIAL NEEDLE BIOPSY  ENDOBRONCHIAL ULTRASOUND (EBUS)    Surgeons and Role:     * Jorge Das MD - Primary    Pre-op vitals reviewed.  Temp: 97.4 °F (36.3 °C)  Pulse: 59  Resp: 18  BP: 161/83  SpO2: 98 %  Body mass index is 22.11 kg/m².    Current medications reviewed.  Hospital Medications:  Current Medications[1]    Outpatient Medications:   Prescriptions Prior to Admission[2]    Allergies: Codeine, Morphine, Oxycodone, Tramadol, Aspirin, Clonidine, Hydrocodone, and Ibuprofen      Anesthesia Evaluation    Patient summary reviewed.    Anesthetic Complications  (+) history of anesthetic complications  History of: PONV       GI/Hepatic/Renal      (+) GERD                           Cardiovascular                  (+) hypertension                                     Endo/Other                                  Pulmonary                           Neuro/Psych                 (+) neuromuscular disease                     Past Surgical History[3]  Social Hx on file[4]  History   Drug Use Unknown     Available pre-op labs reviewed.  Lab Results   Component Value Date    WBC 9.6 03/29/2025    RBC 4.08 03/29/2025    HGB 12.2 03/29/2025    HCT 36.8 03/29/2025    MCV 90.2 03/29/2025    MCH 29.9 03/29/2025    MCHC 33.2 03/29/2025    RDW 14.1 03/29/2025    .0 03/29/2025     Lab Results   Component Value Date     03/29/2025    K 4.2 03/29/2025     03/29/2025    CO2 26.0 03/29/2025    BUN 16 03/29/2025    CREATSERUM 0.70 03/29/2025    GLU 98 03/29/2025    CA 8.9 03/29/2025            Airway      Mallampati: II  Mouth opening: >3 FB  TM distance: > 6 cm   Cardiovascular    Cardiovascular exam  normal.  Rhythm: regular  Rate: normal     Dental             Pulmonary    Pulmonary exam normal.                 Other findings              ASA: 2   Plan: general  NPO status verified and patient meets guidelines.          Plan/risks discussed with: patient and significant other                Present on Admission:  **None**             [1]    lactated ringers infusion   Intravenous Continuous   [2]   Medications Prior to Admission   Medication Sig Dispense Refill Last Dose/Taking    Probiotic Product (PROBIOTIC ADVANCED OR) Take by mouth. Unsure of dose  \"Yoggies \"   5/6/2025    simvastatin 40 MG Oral Tab Take 1 tablet (40 mg total) by mouth nightly.   5/6/2025    anastrozole 1 MG Oral Tab tab Take 1 tablet (1 mg total) by mouth daily.   5/6/2025    Solifenacin Succinate 5 MG Oral Tab Take 1 tablet (5 mg total) by mouth daily.   5/6/2025    XARELTO 20 MG Oral Tab Take 1 tablet (20 mg total) by mouth daily with food. Hold until DR Abdoul mckee to resume- on 4/1/25 90 tablet 3 5/5/2025    acetaminophen 500 MG Oral Tab Take 1 tablet (500 mg total) by mouth every 6 (six) hours as needed for Pain.   5/6/2025    Ascorbic Acid (VITAMIN C) 1000 MG Oral Tab Take 1 tablet (1,000 mg total) by mouth 2 (two) times daily. 60 tablet 0 5/6/2025    atenolol 25 MG Oral Tab Take 1 tablet (25 mg total) by mouth once daily. (Patient taking differently: Take 1 tablet (25 mg total) by mouth every morning.) 90 tablet 3 5/7/2025 at  7:00 AM    Multiple Vitamins-Minerals (MULTIVITAL) Oral Chew Tab Chew by mouth in the morning.   5/6/2025   [3]   Past Surgical History:  Procedure Laterality Date    Back surgery  11/21/2017    Back surgery  06/02/2020    S/P Revision L3-L4 MIS TLIF/PLF, exchange of HW with L3-L4 Fusion    Breast surgery procedure unlisted  1995    L breast bx-benign    Cataract      Colonoscopy      Excisional biopsy left  1995    duct excision    Laser surgery of eye  2001    LASIK    Gabriela localization wire 1 site left  (cpt=19281)      1993 bn    Needle biopsy left      Other surgical history  02/23/1999    Flexible sigmoidoscopy    Spine surgery procedure unlisted      Spine surgery procedure unlisted  07/24/2024    right side Lumbar 2 - 3 extreme lateral interbody fusion with posterior spinal fusion Lumbar 2 - 3 laminotomy left side, Lumbar 2 - 3, Lumbar 3 - 4 instrumented fusion - Yanna   [4]   Social History  Socioeconomic History    Marital status:     Number of children: 3   Occupational History    Occupation: retired teacher     Comment: health and PE    Occupation:    Tobacco Use    Smoking status: Never    Smokeless tobacco: Never   Vaping Use    Vaping status: Never Used   Substance and Sexual Activity    Alcohol use: No     Alcohol/week: 0.0 standard drinks of alcohol    Drug use: Never    Sexual activity: Not Currently     Partners: Male   Other Topics Concern    Caffeine Concern Yes    Exercise Yes     Comment: hike 1 x week    Seat Belt Yes

## 2025-05-07 NOTE — ANESTHESIA POSTPROCEDURE EVALUATION
[facility]    Kanwal Mason Patient Status:  Hospital Outpatient Surgery   Age/Gender 80 year old female MRN [id number]   Location [facility] ENDOS[facility] Attending Jorge Das MD   Hosp Day # 0 PCP Natalie Marshall MD       Anesthesia Post-op Note    BRONCHOSCOPY W/ BRONCHOALVEOLAR LAVAGE / ENDOBRONCHIAL ULTRASOUND (EBUS) W/ TRANSBRONCHIAL NEEDLE BIOPSY & CRYOBIOPSIES    Procedure Summary       Date: 05/07/25 Room / Location:  ENDOSCOPY 04 /  ENDOSCOPY    Anesthesia Start: 1003 Anesthesia Stop: 1107    Procedures:       BRONCHOSCOPY W/ BRONCHOALVEOLAR LAVAGE / ENDOBRONCHIAL ULTRASOUND (EBUS) W/ TRANSBRONCHIAL NEEDLE BIOPSY & CRYOBIOPSIES      ENDOBRONCHIAL ULTRASOUND (EBUS) Diagnosis: (ABNORMAL CT CHEST, ABNORMAL PET)    Surgeons: Jorge Das MD Anesthesiologist: Tez Sullivan MD    Anesthesia Type: general ASA Status: 2            Anesthesia Type: general    Vitals Value Taken Time   /70 05/07/25 11:07   Temp 97.3 °F (36.3 °C) 05/07/25 11:07   Pulse 70 05/07/25 11:07   Resp 16 05/07/25 11:07   SpO2 95 % 05/07/25 11:07           Patient Location: PACU    Anesthesia Type: general    Airway Patency: patent and extubated    Postop Pain Control: adequate    Mental Status: mildly sedated but able to meaningfully participate in the post-anesthesia evaluation    Nausea/Vomiting: none    Cardiopulmonary/Hydration status: stable euvolemic    Complications: no apparent anesthesia related complications    Postop vital signs: stable    Dental Exam: Unchanged from Preop    Patient to be discharged from PACU when criteria met.

## 2025-05-07 NOTE — OPERATIVE REPORT
Patient Name: Kanwal Mason    MRN: [id number]    : 3/19/1945      EBUS Bronchoscopy Procedure Report - [facility]     Preop diagnosis:          Abnormal CT/PET scan, hilar and mediastinal adenopathy    Postop diagnosis:         Abnormal CT/PET scan, hilar and mediastinal adenopathy    Bronchoscopist:   Jorge Das MD    Procedure(s) performed  Standard bronchoscopy  EBUS-TBNA biopsy, station 11L node  EBUS-TBNA biopsy, station 11R node  BAL, right middle lobe      Sedation used:      General Anesthesia     Description of procedure:    Informed consent was obtained. Patient was brought to the bronchoscopy lab. The patient was induced under general anesthesia and intubated by the anesthesiologist.  A standard bronchoscope was inserted via the endotracheal tube.   The trachea appeared normal. The main nitin appeared normal. There were no purulent secretions noted. The right lung was first inspected. The right mainstem appeared normal. The right upper lobe bronchus appeared normal, although there was some anthracotic pigment deposition on the bronchial wall. The right middle lobe and right lower lobe bronchi were inspected and appeared normal. The left lung was then inspected. The left mainstem appeared normal. The left upper lobe appeared normal. The lingula appeared normal. The left lower lobe bronchi appeared normal.  After airway inspection was completed, the standard bronchoscope was removed.     A convex probe endobronchial ultrasound (EBUS) bronchoscope was inserted via the endotracheal tube.  Bilateral lymph node stations were systematically surveyed.  Enlarged lymph nodes were noted at station 11L, 7, and 11R.  A dedicated 22-gauge EBUS-TBNA needle was used to sample station 11L and 11R locations. Multiple passes were taken from each location for slide preparation and cell block preparation by the cytopathology technician.  I attempted a cryo-EBUS of station 11L but was not able to obtain  and adequate tissue sample. Rapid onsite evaluation by the pathologist revealed the presence of anthracotic pigment from the station 11L node, and lymphocytes and a singular granuloma from the station 11R node. The EBUS bronchoscope was removed after lydia sampling was completed.     The standard bronchoscope was reinserted. The scope was advanced to the right middle lobe. Bronchoalveolar lavage was performed with 100 cc of sterile saline in the right middle lobe and aspirated back.  Once the BAL was performed, the airways were cleared of bloody debris. There was adequate hemostasis. The standard bronchoscope was withdrawn. The care of the patient was transferred to the anesthesiologist for reversal of anesthesia and extubation.      The patient tolerated the procedure well and was transferred to the recovery area. Blood loss was minimal.    Samples obtained:                     EBUS-TBNA biopsy, station 11L node  EBUS-TBNA biopsy, station 11R node  BAL, right middle lobe     Assessment:    There was a slight area of anthracotic pigment deposition of the airway wall in the right upper lobe, otherwise there were no endobronchial lesions on inspection bronchoscopy.  EBUS-TBNA biopsy was performed from station 11R and 11L nodes.  Right middle lobe BAL was performed.        Plan:    Follow up cytology and microbiology results         Jorge Das M.D.  Pulmonary/Critical Care

## 2025-05-07 NOTE — DISCHARGE INSTRUCTIONS
Home Care Instructions Following Bronchoscopy / Endobronchial Ultrasound with Sedation    Diet:  Sip fluids initially and advance to your regular diet as tolerated.  Do not drink alcohol today.    Medication:  If you have questions about resuming your normal medications, please contact your Primary Care Physician.  RESUME XARELTO TOMORROW (5/8/2025)    Activities:  Do not drive today.  Do not operate any machinery today (including kitchen equipment).    What to Expect:  A sore throat  A cough  Hoarseness  A small amount of blood in your sputum    Contact Your Doctor If:  You have difficulty breathing  You have chest pain  You have a fever greater than 102°F  You cough up more than a few tablespoons of blood in your sputum    **If unable to reach your doctor, please go to the [facility] Emergency Room**    - Your referring physician will receive a full report of your examination.  - Please contact your physician’s office within one week for results if appointment not scheduled.

## 2025-05-09 LAB — M TB CMPLX RRNA SPEC QL PROBE: NOT DETECTED

## 2025-05-13 LAB — NON GYNE INTERPRETATION: NEGATIVE

## (undated) DEVICE — SINGLE USE SUCTION VALVE MAJ-209: Brand: SINGLE USE SUCTION VALVE (STERILE)

## (undated) DEVICE — GLOVE SURG 7 PROTEXIS ESTM LF CRM PF SMTH BEAD CUFF INTLK

## (undated) DEVICE — WECK HORIZON SMALL YELLOW CLIP DISP

## (undated) DEVICE — NEEDLE SPINAL 20X3-1/2 YELLOW

## (undated) DEVICE — SUTURE VICRYL 1 OS-6 27IN BRAID COAT ABS UNDYED J535H

## (undated) DEVICE — GLOVE SURG 6.5 PREMIERPRO LF NATURAL PF TXTR SMTH STRL

## (undated) DEVICE — DRAPE MSCP ANG LENS LEICA 150X54IN EQUIPMENT

## (undated) DEVICE — GAMMEX® PI HYBRID SIZE 8, STERILE POWDER-FREE SURGICAL GLOVE, POLYISOPRENE AND NEOPRENE BLEND: Brand: GAMMEX

## (undated) DEVICE — GOWN SURG XL L3 NONREINFORCE SET IN SLV STRL LF DISP BLUE

## (undated) DEVICE — SYRINGE MED 10ML SLIP TIP CLR BRL TAPR PLUNG

## (undated) DEVICE — DIFFUSER: Brand: CORE, MAESTRO

## (undated) DEVICE — NEEDLE ASPIR 22GA X 700MM SYR VLV ECHOGENIC

## (undated) DEVICE — 3.0MM PRECISION NEURO (MATCH HEAD)

## (undated) DEVICE — 3 ML SYRINGE LUER-LOCK TIP: Brand: MONOJECT

## (undated) DEVICE — INTENDED USE FOR SURGICAL MARKING ON INTACT SKIN, ALSO PROVIDES A PERMANENT METHOD OF IDENTIFYING OBJECTS IN THE OPERATING ROOM: Brand: WRITESITE® PLUS MINI PREP RESISTANT MARKER

## (undated) DEVICE — SOLUTION IRRIG 1000ML 0.9% NACL USP BTL

## (undated) DEVICE — EVACUATOR SUR 100CC SIL BLB WND

## (undated) DEVICE — SYRINGE 1ML SLIP TIP STRL TB LF DISP

## (undated) DEVICE — NEEDLE CONFIDENCE SPINAL

## (undated) DEVICE — SUTURE MONOCRYL 4-0 PS-2

## (undated) DEVICE — NVM5 MULTIMODALITY SURFACE KIT

## (undated) DEVICE — WRAP COOLING BACK W/NO PILLOW

## (undated) DEVICE — ELECTRODE PT RTN C30- LB 9FT CORD NONIRRITATE NONSENSITIZE

## (undated) DEVICE — PACK CDS LAMINECTOMY

## (undated) DEVICE — NEEDLE SPNL 20GA 3.5IN PVC FREE DEHP-FR STRL LF SPNCN

## (undated) DEVICE — GAMMEX® PI HYBRID SIZE 9, STERILE POWDER-FREE SURGICAL GLOVE, POLYISOPRENE AND NEOPRENE BLEND: Brand: GAMMEX

## (undated) DEVICE — DRAPE BRST CHEST REINF FENESTRATE ARMBRD CVR 122X106X77IN 14

## (undated) DEVICE — SUT VICRYL 1 OS-6 J535H

## (undated) DEVICE — SOLUTION  .9 1000ML BTL

## (undated) DEVICE — DRAPE SRG 90X60IN BCK TBL CVR

## (undated) DEVICE — GAMMEX® PI HYBRID SIZE 7, STERILE POWDER-FREE SURGICAL GLOVE, POLYISOPRENE AND NEOPRENE BLEND: Brand: GAMMEX

## (undated) DEVICE — FLOSEAL HEMOSTATIC MATRIX, 5ML: Brand: FLOSEAL HEMOSTATIC MATRIX

## (undated) DEVICE — 1 ML INSULIN SYRINGE REGULAR LUER TIP: Brand: MONOJECT

## (undated) DEVICE — ADHESIVE SKIN TOP FOR WND CLSR DERMBND ADV

## (undated) DEVICE — Device

## (undated) DEVICE — SUTURE MONOCRYL MTPS 4-0 PS2 18IN MONO ABS UNDYED

## (undated) DEVICE — BAG 28X36IN BAND EQUIPMENT

## (undated) DEVICE — GLOVE SURG 7.5 PREMIERPRO LF NATURAL PF TXTR SMTH STRL

## (undated) DEVICE — GLOVE SURG 7 PROTEXIS LF CRM PF BEAD CUFF STRL PLISPRN 12IN

## (undated) DEVICE — NON-ADHERENT PADS PREPACK: Brand: TELFA

## (undated) DEVICE — MEGADYNE 6.5IN BLADE MONO

## (undated) DEVICE — DRAPE UTIL W15XL25IN FAB FLAME RESIST LO LINT

## (undated) DEVICE — Device: Brand: JELCO

## (undated) DEVICE — PEN: MARKING STD PT 100/CS: Brand: MEDICAL ACTION INDUSTRIES

## (undated) DEVICE — ENCORE® LATEX MICRO SIZE 6.5, STERILE LATEX POWDER-FREE SURGICAL GLOVE: Brand: ENCORE

## (undated) DEVICE — GLOVE SURG 7.5 PROTEXIS LF CRM PF SMTH BEAD CUFF STRL

## (undated) DEVICE — GAUZE,SPONGE,FLUFF,6"X6.75",STRL,5/TRAY: Brand: MEDLINE

## (undated) DEVICE — BLAKE SILICONE DRAIN, 15 FR ROUND, HUBLESS WITH 3/16" TROCAR: Brand: BLAKE

## (undated) DEVICE — SPONGE SURG .5X.5IN CTTND ABS PRCS CUT RADOPQ PATTIE STRL LF

## (undated) DEVICE — KIT,ANTI FOG,W/SPONGE & FLUID,SOFT PACK: Brand: MEDLINE

## (undated) DEVICE — GLOVE,SURG,SENSICARE,ALOE,LF,PF,7: Brand: MEDLINE

## (undated) DEVICE — NEEDLE ASPIR 19GA HIST FLX VIZISHOT 2

## (undated) DEVICE — 1200CC GUARDIAN II: Brand: GUARDIAN

## (undated) DEVICE — CONTAINER SPEC 4OZ 2.5X2.75IN OR POS INDCTR TMPR EVD LEAK

## (undated) DEVICE — 60 ML SYRINGE REGULAR TIP: Brand: MONOJECT

## (undated) DEVICE — SUT VCRL + 1 27IN ABSRB UD OS-6 L36MM

## (undated) DEVICE — GLOVE SURG 9 PROTEXIS LF CRM PF BEAD CUFF STRL PLISPRN 12IN

## (undated) DEVICE — PACK ANTERIOR POSTERIOR ADD ON

## (undated) DEVICE — SUT MCRYL 4-0 18IN PS-2 ABSRB UD 19MM 3/8 CIR

## (undated) DEVICE — SUTURE MONOCRYL 2-0 Y945H

## (undated) DEVICE — DERMABOND LIQUID ADHESIVE

## (undated) DEVICE — SINGLE USE BIOPSY VALVE MAJ-210: Brand: SINGLE USE BIOPSY VALVE (STERILE)

## (undated) DEVICE — RETRACTOR 80MM PANORAMA SLOT PORT 26MM SURG MIS NS LF DISP

## (undated) DEVICE — DRAPE EXPAND CLPSBL C ARM FLRSCP EQUIPMENT C-ARMOR STRL

## (undated) DEVICE — STAPLER SKIN 30 ABSRB STPL RAP INSORB

## (undated) DEVICE — GLOVE SUR 7 SENSICARE NEOPR PWD F

## (undated) DEVICE — SHEET,DRAPE,53X77,STERILE: Brand: MEDLINE

## (undated) DEVICE — LAMINECTOMY: Brand: MEDLINE INDUSTRIES, INC.

## (undated) DEVICE — KIT CUSTOM ENDOPROCEDURE STERIS

## (undated) DEVICE — 4-WAY HIGH FLOW STOPCOCK W/ROTATING LUER: Brand: ICU MEDICAL

## (undated) DEVICE — MINOR GENERAL: Brand: MEDLINE INDUSTRIES, INC.

## (undated) DEVICE — SUPER SPONGES,MEDIUM: Brand: KERLIX

## (undated) DEVICE — WECK HORIZON MED BLUE CLIP DISP

## (undated) DEVICE — MEDI-VAC NON-CONDUCTIVE SUCTION TUBING: Brand: CARDINAL HEALTH

## (undated) DEVICE — SUT MCRYL 2-0 36IN CT-1 ABSRB UD L36MM TAPR P

## (undated) DEVICE — DRAPE TAPE 25X15IN SURG STRL SMS

## (undated) DEVICE — SYRINGE MED 10ML LL TIP W/O SFTY DISP

## (undated) DEVICE — BIOPATCH™ ANTIMICROBIAL DRESSING WITH CHLORHEXIDINE GLUCONATE IS A HYDROPHILLIC POLYURETHANE ABSORPTIVE FOAM WITH CHLORHEXIDINE GLUCONATE (CHG) WHICH INHIBITS BACTERIAL GROWTH UNDER THE DRESSING. THE DRESSING IS INTENDED TO BE USED TO ABSORB EXUDATE, COVER A WOUND CAUSED BY VASCULAR AND NONVASCULAR PERCUTANEOUS MEDICAL DEVICES DURING SURGERY, AS WELL AS REDUCE LOCAL INFECTION AND COLONIZATION OF MICROORGANISMS.: Brand: BIOPATCH

## (undated) DEVICE — GOWN SURG 2XL L3 NONREINFORCE SET IN SLV STRL LF DISP BLUE

## (undated) DEVICE — CONTAINER SPEC STR 4OZ GRY LID

## (undated) DEVICE — GLOVE SURG 7 PROTEXIS LF BLUE PF SMTH BEAD CUFF INTLK STRL

## (undated) DEVICE — MONITORING NEUROPHYSIOLOGICAL

## (undated) DEVICE — ADHESIVE PREMIERPRO EXOFIN 1ML HVISC TUBE SOFT FLXB APL

## (undated) DEVICE — DRESSING WND TELFA 5X4IN ABS NADH FILM ISLAND NONWOVEN POLY

## (undated) DEVICE — SLOT PORT RET ASS 26X60

## (undated) DEVICE — SPONGE LAP 4X18 XRAY STRL

## (undated) DEVICE — ENDOPATH 5MM ENDOSCOPIC BLUNT TIP DISSECTORS (12 POUCHES CONTAINING 3 DISSECTORS EACH): Brand: ENDOPATH

## (undated) DEVICE — DRESSING ADH 8X4IN ABS NADH FILM ISLAND NONWOVEN KENDALL

## (undated) DEVICE — SUT PDS II 2-0 FS-1 Z443H

## (undated) DEVICE — BOWL MED MD 16OZ PLAS CAP GRAD

## (undated) DEVICE — UNDYED BRAIDED (POLYGLACTIN 910), SYNTHETIC ABSORBABLE SUTURE: Brand: COATED VICRYL

## (undated) DEVICE — OIL CARTRIDGE: Brand: CORE, MAESTRO

## (undated) DEVICE — FRAZIER SUCTION INSTRUMENT 12 FR W/CONTROL VENT & OBTURATOR: Brand: FRAZIER

## (undated) DEVICE — GOWN SURG LG L3 NONREINFORCE SET IN SLV STRL LF DISP BLUE

## (undated) DEVICE — PENCIL ES BTTN SWCH W/ TIP HOLSTER E-Z CLN

## (undated) DEVICE — ANTIBACTERIAL UNDYED BRAIDED (POLYGLACTIN 910), SYNTHETIC ABSORBABLE SUTURE: Brand: COATED VICRYL

## (undated) DEVICE — SNAP KOVER: Brand: UNBRANDED

## (undated) DEVICE — DRAPE SRG 26X15IN UTL TPE STRL

## (undated) DEVICE — C-ARMOR C-ARM EQUIPMENT COVERS CLEAR STERILE UNIVERSAL FIT 12 PER CASE: Brand: C-ARMOR

## (undated) DEVICE — GAMMEX® PI HYBRID SIZE 7.5, STERILE POWDER-FREE SURGICAL GLOVE, POLYISOPRENE AND NEOPRENE BLEND: Brand: GAMMEX

## (undated) DEVICE — HANDLE SCT MDVC FRZR 12FR CNTRL VENT STRL LF DISP

## (undated) DEVICE — SPONGE LAP 18X4IN CTN PREWASH SFPK XRAY DTCT STRL LF DISP

## (undated) DEVICE — MAJ-1414 SINGLE USE ADPATER BIOPSY VALV: Brand: SINGLE USE ADAPTOR BIOPSY VALVE

## (undated) DEVICE — DRAPE SHEET LG

## (undated) DEVICE — SYRINGE 1ML GRAD STRL MED LF DISP LL

## (undated) DEVICE — SOL  .9 1000ML BTL

## (undated) DEVICE — SUTURE VICRYL 1 OS-6

## (undated) DEVICE — SUT MONOCRYL 4-0 PS-2 Y496G

## (undated) DEVICE — MICRO KOVER: Brand: UNBRANDED

## (undated) DEVICE — NON-ADHERENT PAD PREPACK: Brand: TELFA

## (undated) DEVICE — KNIFE PANORAMA BAY 193MM

## (undated) DEVICE — KNIFE SURG BAYONET SPNL 193MM

## (undated) DEVICE — SUT COAT VCRL 2-0 27IN ABSRB UD 26MM CT-2

## (undated) DEVICE — FORCEP CUSH BIP BAY 1.5MX7.5IN

## (undated) DEVICE — SCREW SPNL 6.5X45MM CANN EXT TI POLYAX
Type: IMPLANTABLE DEVICE | Site: BACK | Status: NON-FUNCTIONAL
Removed: 2024-07-24

## (undated) DEVICE — PAD,NON-ADHERENT,3X8,STERILE,LF,1/PK: Brand: MEDLINE

## (undated) DEVICE — TOOL 14CM LGND 3MM 2 FLAT MATCH HEAD DSCT BURR

## (undated) DEVICE — NEEDLE BLUNT 18GA 1.5IN REG WALL FILL LL HUB DEHP-FR STRL LF

## (undated) DEVICE — TOWEL SURG 26X15IN BLUE TIBURON NABSB DRAPE ADH STRIP STRL

## (undated) DEVICE — PREMIERPRO LAP SPONGE 4"X18" STERILE, 5/PK: Brand: PREMIERPRO

## (undated) DEVICE — GLOVE SUR 6.5 SENSICARE PI MIC PIP CRM PWD F

## (undated) DEVICE — YANKAUER,FLEXIBLE HANDLE,REGLR CAPACITY: Brand: MEDLINE INDUSTRIES, INC.

## (undated) DEVICE — NEEDLE SPNL 10GA 5IN BVL

## (undated) DEVICE — GLOVE SUR 7.5 SENSICARE PIP WHT PWD F

## (undated) DEVICE — Device: Brand: BALLOON

## (undated) DEVICE — COVER,TABLE,60X90,STERILE: Brand: MEDLINE

## (undated) DEVICE — TOWEL OR BLU 16X26IN 4PK

## (undated) DEVICE — WIRE FX AIM KIRSCHNER NTNL 1 RND THRD END MIS NS LF DISP

## (undated) DEVICE — CONTAINER,SPECIMEN,OR STERILE,4OZ: Brand: MEDLINE

## (undated) DEVICE — FORCEP CUSH BAY BP DISP 7.5IN

## (undated) DEVICE — SYRINGE 3ML GRAD N-PYRG DEHP-FR PVC FREE STRL MED LF DISP LL

## (undated) DEVICE — K-WIRE W THREADED END SPNCRFT

## (undated) DEVICE — 3M™ RED DOT™ MONITORING ELECTRODE WITH FOAM TAPE AND STICKY GEL, 50/BAG, 20/CASE, 72/PLT 2570: Brand: RED DOT™

## (undated) DEVICE — DRAPE,T,LAPARO,TRANS,STERILE: Brand: MEDLINE

## (undated) DEVICE — SLOT PORT RET ASS 26X70

## (undated) DEVICE — INSULATED BLADE ELECTRODE: Brand: EDGE

## (undated) DEVICE — NEEDLE SPNL 20GA L3.5IN YEL QNCKE STYL DISP

## (undated) DEVICE — SOLUTION IRR 1000ML 0.9% NACL PLASTIC POUR BTL ISTNC N-PYRG

## (undated) DEVICE — GLOVE SURG 7 PREMIERPRO LF NATURAL PF TXTR SMTH STRL

## (undated) DEVICE — BAG SRG CLR 36X30IN

## (undated) DEVICE — TRAY CATH FOLEY 16FR INCLUDE BARDX IC COMPLT CARE

## (undated) DEVICE — TOOL 14CM MR8 3MM MATCH HEAD DSCT STRL DISP

## (undated) DEVICE — GLOVE SURG 7.5 PROTEXIS LF BLUE PF SMTH BEAD CUFF INTLK STRL

## (undated) DEVICE — SUCTION CANISTER, 3000CC,SAFELINER: Brand: DEROYAL

## (undated) DEVICE — 11.1-15K-WIRENITINOLW/S

## (undated) DEVICE — NEEDLE SPNL 18GA 3.5IN PVC FREE DEHP-FR STRL LF SPNCN

## (undated) DEVICE — ADAPTER BRONCHSCP 15MM FBROPT SWVL 2 AXIS

## (undated) DEVICE — DRAPE SRG 150X54IN LEICA

## (undated) DEVICE — KIT HEMSTAT MTRX 8ML PORCINE GEL HUM THROM

## (undated) DEVICE — GLOVE SURG 6.5 PROTEXIS LF CRM PF BEAD CUFF STRL PLISPRN

## (undated) DEVICE — CLOSURE EXOFIN 1.0ML

## (undated) DEVICE — TRAP,MUCUS SPECIMEN, 80CC: Brand: MEDLINE

## (undated) DEVICE — ELECTRODE ESURG BLADE 6.5IN EDGE LF

## (undated) DEVICE — TRAY FOLEY BDX 16F STATLOCK

## (undated) DEVICE — DRAPE ADH STRIP SM TWL MATTE FNSH 17X11IN SURG STRDRP STRL

## (undated) DEVICE — TOOL 9CM MR8 3MM METAL CUTTER DSCT METAL CUT STRL DISP

## (undated) DEVICE — HANDPIECE SCT MDVC YNKR BLBS TIP CLR STRL LF DISP

## (undated) DEVICE — SUT MONOCRYL 2-0 SH Y417H

## (undated) DEVICE — MEDI-VAC SUCTION HANDLE REGULAR CAPACITY: Brand: CARDINAL HEALTH

## (undated) DEVICE — Device: Brand: ERBE

## (undated) DEVICE — SUCTION TIP FRAZIER 10FR #3

## (undated) DEVICE — TRAY CATH CMP CR LUBRI-SIL IC STLK SURESTEP 16FR FOLEY URMTR